# Patient Record
Sex: FEMALE | Race: WHITE | NOT HISPANIC OR LATINO | Employment: FULL TIME | URBAN - METROPOLITAN AREA
[De-identification: names, ages, dates, MRNs, and addresses within clinical notes are randomized per-mention and may not be internally consistent; named-entity substitution may affect disease eponyms.]

---

## 2017-01-09 ENCOUNTER — ALLSCRIPTS OFFICE VISIT (OUTPATIENT)
Dept: OTHER | Facility: OTHER | Age: 57
End: 2017-01-09

## 2017-01-09 DIAGNOSIS — E78.5 HYPERLIPIDEMIA: ICD-10-CM

## 2017-01-09 DIAGNOSIS — M25.50 PAIN IN JOINT: ICD-10-CM

## 2017-03-24 ENCOUNTER — ALLSCRIPTS OFFICE VISIT (OUTPATIENT)
Dept: OTHER | Facility: OTHER | Age: 57
End: 2017-03-24

## 2017-09-08 ENCOUNTER — GENERIC CONVERSION - ENCOUNTER (OUTPATIENT)
Dept: OTHER | Facility: OTHER | Age: 57
End: 2017-09-08

## 2017-09-08 DIAGNOSIS — R30.0 DYSURIA: ICD-10-CM

## 2017-10-11 ENCOUNTER — GENERIC CONVERSION - ENCOUNTER (OUTPATIENT)
Dept: OTHER | Facility: OTHER | Age: 57
End: 2017-10-11

## 2017-10-11 DIAGNOSIS — Z12.31 ENCOUNTER FOR SCREENING MAMMOGRAM FOR MALIGNANT NEOPLASM OF BREAST: ICD-10-CM

## 2017-10-19 ENCOUNTER — GENERIC CONVERSION - ENCOUNTER (OUTPATIENT)
Dept: OTHER | Facility: OTHER | Age: 57
End: 2017-10-19

## 2017-10-22 ENCOUNTER — GENERIC CONVERSION - ENCOUNTER (OUTPATIENT)
Dept: OTHER | Facility: OTHER | Age: 57
End: 2017-10-22

## 2017-10-22 LAB
BACTERIA UR QL AUTO: NORMAL /HPF
BILIRUB UR QL STRIP: NEGATIVE
COLOR UR: YELLOW
COMMENT (HISTORICAL): CLEAR
CULTURE RESULT (HISTORICAL): NORMAL
FECAL OCCULT BLOOD DIAGNOSTIC (HISTORICAL): NEGATIVE
GLUCOSE (HISTORICAL): NEGATIVE
HYALINE CASTS #/AREA URNS LPF: NORMAL /LPF
KETONES UR STRIP-MCNC: NEGATIVE MG/DL
LEUKOCYTE ESTERASE UR QL STRIP: NEGATIVE
NITRITE UR QL STRIP: NEGATIVE
PH UR STRIP.AUTO: 6 [PH] (ref 5–8)
PROT UR STRIP-MCNC: NEGATIVE MG/DL
RBC (HISTORICAL): NORMAL /HPF
SP GR UR STRIP.AUTO: 1.02 (ref 1–1.03)
SQUAMOUS EPITHELIAL CELLS (HISTORICAL): NORMAL /HPF
WBC # BLD AUTO: NORMAL /HPF

## 2017-11-13 ENCOUNTER — ALLSCRIPTS OFFICE VISIT (OUTPATIENT)
Dept: OTHER | Facility: OTHER | Age: 57
End: 2017-11-13

## 2017-11-13 PROCEDURE — G0145 SCR C/V CYTO,THINLAYER,RESCR: HCPCS | Performed by: FAMILY MEDICINE

## 2017-11-13 PROCEDURE — 87624 HPV HI-RISK TYP POOLED RSLT: CPT | Performed by: FAMILY MEDICINE

## 2017-11-15 ENCOUNTER — LAB REQUISITION (OUTPATIENT)
Dept: LAB | Facility: HOSPITAL | Age: 57
End: 2017-11-15
Payer: COMMERCIAL

## 2017-11-15 DIAGNOSIS — Z01.419 ENCOUNTER FOR GYNECOLOGICAL EXAMINATION WITHOUT ABNORMAL FINDING: ICD-10-CM

## 2017-11-15 NOTE — PROGRESS NOTES
Assessment  1  Encounter for routine gynecological examination (V72 31) (Z01 419)    Plan     · Follow-up visit in 4 Months Evaluation and Treatment  Follow-up  Status: Hold For -Scheduling  Requested for: [id number]  Ordered; For: ADD (attention deficit disorder); Ordered By: Marixa Arriola  Performed:   Due: 35GRG3992     · ([address] [facility]) Pap IG, HPV-hr; Status:Active; Requested for:13Nov2017;   Perform:[facility]; Order Comments:Sample taken from cervix and endocervix  FDLMP over 15 months ago  Last pap about 3 years ago and reported as wnl ; Due:54Dfe1425; Ordered; For:Encounter for routine gynecological examination; Ordered By:Yadira Valentine;    Discussion/Summary  health maintenance visit Currently, she eats a healthy diet  the risks and benefits of cervical cancer screening were discussed HPV and Pap Co-testing Done Today Breast cancer screening: the risks and benefits of breast cancer screening were discussed and mammogram is current  Colorectal cancer screening: the risks and benefits of colorectal cancer screening were discussed and colorectal cancer screening is current  Osteoporosis screening: the risks and benefits of osteoporosis screening were discussed and bone mineral density testing is not indicated  Will call with Pap results  Try to get at least 1200 mg of calcium in your diet daily and take at least 2000 units of vitamin D3 daily  Continue weight-bearing exercise, and flu shot today  Patient is able to Self-Care  The treatment plan was reviewed with the patient/guardian  The patient/guardian understands and agrees with the treatment plan      Chief Complaint  Patient is in the office today for a GYN examination      History of Present Illness  HPI: Pt is here for her routine gyn f/u  Has a lot of problems at home  Very stressful, but now doing better  Last pap 2014 and reported as wnl  FDLMP over 1 year ago  Always has normal paps, but told she has dense breasts   pt does not examine her breast  Great aunt with breast cancer  GYN , Adult [facility]: The patient is being seen for a gynecology evaluation  The last health maintenance visit was 3 year(s) ago  Social History: Household members include spouse,-- daughter(s)-- and-- and mother in law  She is   Work status: working full time-- and-- occupation:   The patient is a former cigarette smoker  She reports occasional alcohol use  General Health: The patient's health since the last visit is described as good  She has regular dental visits  -- She denies vision problems  -- She denies hearing loss  Immunizations status: up to date  Lifestyle:  She does not have a healthy diet  -- She has weight concerns  -- She does not exercise regularly  -- She does not use tobacco -- She consumes alcohol -- She denies drug use  Reproductive health: the patient is postmenopausal    Screening:      Review of Systems  no pelvic pain,-- no pelvic pressure,-- no vaginal pain,-- no vaginal discharge,-- no vaginal itching,-- no vaginal lump or mass,-- no vaginal odor,-- no dysuria,-- no bladder pain,-- no hematuria,-- no change in urinary frequency,-- no abnormal urethral discharge-- and-- urine is not foul-smelling  Constitutional: no fever-- and-- no chills  ENT: no ear ache, no loss of hearing, no nosebleeds or nasal discharge, no sore throat or hoarseness  Cardiovascular: no chest pain-- and-- no palpitations  Respiratory: no cough-- and-- no wheezing  Breasts: no breast pain-- and-- no breast lump  Gastrointestinal: + reflux, but-- no abdominal pain-- and-- no constipation  Genitourinary: no dysuria-- and-- no incontinence  Musculoskeletal: no arthralgias-- and-- no myalgias  Integumentary: no rashes-- and-- no skin lesions  Neurological: no headache-- and-- no dizziness  ROS reviewed  OB History  Pregnancy History (Brief):  Prior pregnancies: : 6   Para: 3 (full-term),-- 1 (abortions)-- and-- 3 (living)  Delivery type: vaginal  Additional pregnancy history details: 2 miscarriage(s)       Active Problems    1  ADD (attention deficit disorder) (314 00) (F98 8)  2  Anxiety state (300 00) (F41 1)  3  Arthralgia of multiple joints (719 49) (M25 50)  4  Bleeding (459 0) (R58)  5  Dysuria (788 1) (R30 0)  6  Encounter for routine gynecological examination (V72 31) (Z01 419)  7  Encounter for screening mammogram for malignant neoplasm of breast (V76 12) (Z12 31)  8  Flu vaccine need (V04 81) (Z23)  9  History of varicose vein ligation and stripping (V15 29) (Z98 890)  10  Hyperlipidemia (272 4) (E78 5)  11  Irregular heart beat (427 9) (I49 9)  12  Sinusitis (473 9) (J32 9)  13  Subacute pansinusitis (461 8) (J01 40)  14  Symptomatic menopausal or female climacteric states (627 2) (N95 1)  15  Thyroid nodule (241 0) (E04 1)  16  Varicose veins of left lower extremity with other complications (788 0) (M97 718)  17  Yeast vaginitis (112 1) (B37 3)    Past Medical History    Medical history: No diabetes  No hypertension  No heart disease  The active problems and past medical history were reviewed and updated today  Surgical History   · History of Dilation And Curettage   · History of Leg Repair   · History of Tubal Ligation   · History of Venous Lig  & Stripping + Skin Graft Or Venous Interruption    The surgical history was reviewed and updated today         Family History   · Family history of Diverticulitis   · Family history of diabetes mellitus (V18 0) (Z83 3)   · Family history of hypertension (V17 49) (Z82 49)   · Family history of osteoporosis (V17 81) (Z82 62)   · Family history of varicose veins (V17 49) (Z82 49)   · Family history of diabetes mellitus (V18 0) (Z83 3)   · Family history of hypertension (V17 49) (Z82 49)   · Family history of mental disorder (V17 0) (Z81 8)   · Family history of mental disorder (V17 0) (Z81 8)   · Family history of breast disorder (V19 8) (Z84 2)   · Family history of Diverticulitis   · Family history of osteoporosis (V17 81) (Z82 62)   · Family history of diabetes mellitus (V18 0) (Z83 3)   · Family history of hyperlipidemia (V18 19) (Z83 49)   · Family history of hypertension (V17 49) (Z82 49)    The family history was reviewed and updated today  Social History     · Always uses seat belt   · Consumes alcohol at social events (V49 89) (Z78 9)   · Daily caffeine consumption   · Denied: History of Drug use   · Former smoker (V15 82) (U63 164)   · Full-time employment   · Social alcohol use (Z78 9)   · Three children  The social history was reviewed and updated today  Current Meds  1  BusPIRone HCl - 15 MG Oral Tablet; 1/2 po bid prn for anxiety  May increase to 1 pill bid prn; Therapy: 23Kpg1542 to (Last OB:29SNO2112)  Requested for: 52AZA8621 Ordered  2  Cefuroxime Axetil 500 MG Oral Tablet; 1 bid with food; Therapy: 08DZG2037 to (Last Rx:24Mar2017)  Requested for: 24Mar2017 Ordered  3  Fluconazole 150 MG Oral Tablet; TAKE 1 TABLET 1 TIME ONLY; Therapy: 28DBF7061 to (Evaluate:89Woe3553)  Requested for: 16Gwc6652; Last Rx:77Mvl4210 Ordered  4  Multiple Vitamin TABS Recorded  5  Strattera 40 MG Oral Capsule; TAKE 1 CAPSULE BY MOUTH EVERY DAY; Therapy: 09JZY4697 to (Evaluate:93Lsp6073)  Requested for: 40ANX2524; Last Rx:09Jan2017 Ordered    Allergies  1  Penicillins  2  Seasonal    Vitals   Recorded: 52YSU5677 05:42PM   Temperature 98 1 F   Heart Rate 71   Systolic 403   Diastolic 74   Height 5 ft 7 in   Weight 171 lb 2 oz   BMI Calculated 26 8   BSA Calculated 1 89   O2 Saturation 98       Physical Exam   Constitutional  General appearance: No acute distress, well appearing and well nourished  Neck  Neck: Normal, supple, trachea midline, no masses  Thyroid: Normal, no thyromegaly  Pulmonary  Respiratory effort: No increased work of breathing or signs of respiratory distress  Auscultation of lungs: Clear to auscultation     Cardiovascular  Peripheral vascular exam: Normal pulses Throughout  Genitourinary  External genitalia: Normal and no lesions appreciated  Vagina: Normal, no lesions or dryness appreciated  Urethra: Normal    Urethral meatus: Normal    Bladder: Normal, soft, non-tender and no prolapse or masses appreciated  Cervix: Normal, no palpable masses  Uterus: Normal, non-tender, not enlarged, and no palpable masses  -- Not palpable r/t weight  Chest  Breasts: Normal and no dimpling or skin changes noted  Abdomen  Abdomen: Normal, non-tender, and no organomegaly noted  Lymphatic  Palpation of lymph nodes in neck, axillae, groin and/or other locations: No lymphadenopathy or masses noted  Psychiatric  Orientation to person, place, and time: Normal    Mood and affect: Normal        Attending Note  Collaborating Physician Note: Collaborating Note: I supervised the Advanced Practitioner-- and-- I agree with the Advanced Practitioner note  Signatures   Electronically signed by :  SUSHILA Brink; Nov 13 2017  6:08PM EST                       (Author)    Electronically signed by : Mynor Lara DO; Nov 14 2017  8:28AM EST                       (Co-author)

## 2017-11-16 LAB — HPV RRNA GENITAL QL NAA+PROBE: NORMAL

## 2017-11-20 LAB
LAB AP GYN PRIMARY INTERPRETATION: NORMAL
Lab: NORMAL

## 2018-01-13 VITALS
OXYGEN SATURATION: 98 % | TEMPERATURE: 98.1 F | DIASTOLIC BLOOD PRESSURE: 74 MMHG | WEIGHT: 171.13 LBS | SYSTOLIC BLOOD PRESSURE: 126 MMHG | HEART RATE: 71 BPM | HEIGHT: 67 IN | BODY MASS INDEX: 26.86 KG/M2

## 2018-01-14 VITALS
HEIGHT: 67 IN | TEMPERATURE: 99.1 F | DIASTOLIC BLOOD PRESSURE: 80 MMHG | OXYGEN SATURATION: 100 % | WEIGHT: 175.13 LBS | RESPIRATION RATE: 12 BRPM | BODY MASS INDEX: 27.49 KG/M2 | HEART RATE: 65 BPM | SYSTOLIC BLOOD PRESSURE: 118 MMHG

## 2018-01-14 VITALS
DIASTOLIC BLOOD PRESSURE: 60 MMHG | WEIGHT: 175 LBS | SYSTOLIC BLOOD PRESSURE: 106 MMHG | HEIGHT: 67 IN | BODY MASS INDEX: 27.47 KG/M2 | OXYGEN SATURATION: 98 %

## 2018-01-15 NOTE — RESULT NOTES
Verified Results  (1) URINALYSIS w URINE C/S REFLEX (will reflex a microscopy if leukocytes, occult blood, or nitrites are not within normal limits) 21Oct2017 08:43AM Yadria Valentine     Test Name Result Flag Reference   SPECIFIC GRAVITY 1 020  1 001-1 035   BILIRUBIN NEGATIVE  NEGATIVE   GLUCOSE NEGATIVE  NEGATIVE   COLOR YELLOW  YELLOW   APPEARANCE CLEAR  CLEAR   PH 6 0  5 0-8 0   KETONES NEGATIVE  NEGATIVE   OCCULT BLOOD NEGATIVE  NEGATIVE   PROTEIN NEGATIVE  NEGATIVE   SQUAMOUS EPITHELIAL CELLS NONE SEEN /HPF  < OR = 5   HYALINE CAST NONE SEEN /LPF  NONE SEEN   REFLEXIVE URINE CULTURE NO CULTURE INDICATED     RBC 0-2 /HPF  < OR = 2   NITRITE NEGATIVE  NEGATIVE   LEUKOCYTE ESTERASE NEGATIVE  NEGATIVE   WBC 0-5 /HPF  < OR = 5   BACTERIA NONE SEEN /HPF  NONE SEEN

## 2018-01-16 NOTE — MISCELLANEOUS
To whom this may concern,     Lord Caraballo is under my professional care  She was prescribed Strattera 40mg oral capsules to be taken once a day to help treat her ADD (attention deficit disorder)  If there are any question or concerns please feel to contact our office at [phone], option 2      Sincerely,          SUSHILA Mckinney

## 2018-02-06 DIAGNOSIS — F90.9 ATTENTION DEFICIT HYPERACTIVITY DISORDER (ADHD), UNSPECIFIED ADHD TYPE: Primary | ICD-10-CM

## 2018-02-06 DIAGNOSIS — Z79.899 HIGH RISK MEDICATIONS (NOT ANTICOAGULANTS) LONG-TERM USE: Primary | ICD-10-CM

## 2018-02-06 RX ORDER — ATOMOXETINE 40 MG/1
1 CAPSULE ORAL DAILY
COMMUNITY
Start: 2014-03-15 | End: 2018-02-06 | Stop reason: SDUPTHER

## 2018-02-06 NOTE — TELEPHONE ENCOUNTER
Pt came into office today thinking she had a appt scheduled this morning with you  Informed her she was not on the schedule , she needs her meds refilled, says she is out I did get her scheduled for 02/20/2018 with you but wanted to know if her meds can be refilled before hand  Also she would like order to be put in for lab work before her Next appt  Boaz Side

## 2018-02-07 RX ORDER — ATOMOXETINE 40 MG/1
40 CAPSULE ORAL DAILY
Qty: 30 CAPSULE | Refills: 3 | Status: SHIPPED | OUTPATIENT
Start: 2018-02-07 | End: 2018-11-18 | Stop reason: SDUPTHER
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
## 2018-02-12 LAB
ALBUMIN SERPL-MCNC: 4.6 G/DL (ref 3.6–5.1)
ALBUMIN/GLOB SERPL: 1.6 (CALC) (ref 1–2.5)
ALP SERPL-CCNC: 78 U/L (ref 33–130)
ALT SERPL-CCNC: 20 U/L (ref 6–29)
APPEARANCE UR: CLEAR
AST SERPL-CCNC: 16 U/L (ref 10–35)
B BURGDOR AB SER IA-ACNC: <0.9 INDEX
BILIRUB SERPL-MCNC: 0.5 MG/DL (ref 0.2–1.2)
BILIRUB UR QL STRIP: NEGATIVE
BUN SERPL-MCNC: 18 MG/DL (ref 7–25)
BUN/CREAT SERPL: ABNORMAL (CALC) (ref 6–22)
CALCIUM SERPL-MCNC: 10.1 MG/DL (ref 8.6–10.4)
CHLORIDE SERPL-SCNC: 104 MMOL/L (ref 98–110)
CHOLEST SERPL-MCNC: 253 MG/DL
CHOLEST/HDLC SERPL: 4.4 (CALC)
CO2 SERPL-SCNC: 27 MMOL/L (ref 20–31)
COLOR UR: YELLOW
CREAT SERPL-MCNC: 0.93 MG/DL (ref 0.5–1.05)
GLOBULIN SER CALC-MCNC: 2.8 G/DL (CALC) (ref 1.9–3.7)
GLUCOSE SERPL-MCNC: 100 MG/DL (ref 65–99)
GLUCOSE UR QL STRIP: NEGATIVE
HDLC SERPL-MCNC: 58 MG/DL
HGB UR QL STRIP: NEGATIVE
KETONES UR QL STRIP: NEGATIVE
LDLC SERPL CALC-MCNC: 165 MG/DL (CALC)
LEUKOCYTE ESTERASE UR QL STRIP: NEGATIVE
NITRITE UR QL STRIP: NEGATIVE
NONHDLC SERPL-MCNC: 195 MG/DL (CALC)
PH UR STRIP: 7 [PH] (ref 5–8)
POTASSIUM SERPL-SCNC: 4 MMOL/L (ref 3.5–5.3)
PROT SERPL-MCNC: 7.4 G/DL (ref 6.1–8.1)
PROT UR QL STRIP: NEGATIVE
SL AMB EGFR AFRICAN AMERICAN: 79 ML/MIN/1.73M2
SL AMB EGFR NON AFRICAN AMERICAN: 68 ML/MIN/1.73M2
SODIUM SERPL-SCNC: 142 MMOL/L (ref 135–146)
SP GR UR STRIP: 1.02 (ref 1–1.03)
TRIGL SERPL-MCNC: 158 MG/DL

## 2018-02-20 ENCOUNTER — OFFICE VISIT (OUTPATIENT)
Dept: FAMILY MEDICINE CLINIC | Facility: CLINIC | Age: 58
End: 2018-02-20
Payer: COMMERCIAL

## 2018-02-20 VITALS
OXYGEN SATURATION: 98 % | TEMPERATURE: 97.4 F | HEART RATE: 64 BPM | HEIGHT: 67 IN | DIASTOLIC BLOOD PRESSURE: 80 MMHG | SYSTOLIC BLOOD PRESSURE: 128 MMHG | RESPIRATION RATE: 14 BRPM | BODY MASS INDEX: 27.4 KG/M2 | WEIGHT: 174.6 LBS

## 2018-02-20 DIAGNOSIS — M25.532 WRIST PAIN, ACUTE, LEFT: ICD-10-CM

## 2018-02-20 DIAGNOSIS — L98.9 SKIN LESIONS, GENERALIZED: ICD-10-CM

## 2018-02-20 DIAGNOSIS — J34.2 DEVIATED SEPTUM: ICD-10-CM

## 2018-02-20 DIAGNOSIS — E78.2 MIXED HYPERLIPIDEMIA: ICD-10-CM

## 2018-02-20 DIAGNOSIS — F90.2 ATTENTION DEFICIT HYPERACTIVITY DISORDER (ADHD), COMBINED TYPE: Primary | ICD-10-CM

## 2018-02-20 PROCEDURE — 99214 OFFICE O/P EST MOD 30 MIN: CPT | Performed by: FAMILY MEDICINE

## 2018-02-20 PROCEDURE — 3008F BODY MASS INDEX DOCD: CPT | Performed by: FAMILY MEDICINE

## 2018-02-20 PROCEDURE — 1036F TOBACCO NON-USER: CPT | Performed by: FAMILY MEDICINE

## 2018-02-20 RX ORDER — BUSPIRONE HYDROCHLORIDE 15 MG/1
TABLET ORAL
Refills: 5 | COMMUNITY
Start: 2017-12-05 | End: 2018-07-11 | Stop reason: SDUPTHER

## 2018-02-20 RX ORDER — ROSUVASTATIN CALCIUM 5 MG/1
5 TABLET, COATED ORAL DAILY
Qty: 30 TABLET | Refills: 1 | Status: SHIPPED | OUTPATIENT
Start: 2018-02-20 | End: 2019-08-28 | Stop reason: ALTCHOICE

## 2018-02-20 RX ORDER — MULTIVITAMIN
TABLET ORAL
COMMUNITY

## 2018-02-20 NOTE — PROGRESS NOTES
Assessment/Plan:     Chronic Problems:  Attention deficit hyperactivity disorder (ADHD), combined type   Stay on the current meds you seem to be doing well on this medication  Mixed hyperlipidemia    Will start low-dose rosuvastatin and check in 6 weeks  May need to increase the dose  Visit Diagnosis:  Diagnoses and all orders for this visit:    Attention deficit hyperactivity disorder (ADHD), combined type    Mixed hyperlipidemia  -     rosuvastatin (CRESTOR) 5 mg tablet; Take 1 tablet (5 mg total) by mouth daily  -     Lipid panel; Future  -     AST; Future  -     ALT; Future    Wrist pain, acute, left  Comments:    Suggest you change your watch to the right wrist   May need x-rays  Advil or Aleve for pain  Skin lesions, generalized  Comments: Will refer to Dr Helena Melissa time  Orders:  -     Ambulatory referral to Dermatology; Future    Deviated septum  Comments:   will refer to Dr Sera Collins  May need sleep studies  Orders:  -     Ambulatory Referral to Otolaryngology; Future    Other orders  -     Multiple Vitamin tablet; Take by mouth  -     busPIRone (BUSPAR) 15 mg tablet; TAKE 1/2 TABLET BY MOUTH AS NEEDED FOR ANXIETY  MAY INSCREASE TO 1 TABLET TWICE DAILY AS NEEDED          Subjective:    Patient ID: Isaías Brizuela is a 62 y o  female  Pt is here for routine f/u  Seen by ent in [place] and told she has a deviated septum  Wants referral to ent for evaluation  Pt states she snores terribly but does not stop breathing  Also has pain in the left wrist  Having problems opening jars  Gets sharp pains  Also has some moles she wants looked at  Would like to see Dr Vignesh Sanders  Feels she is doing well on strattera and buspar  Feels bloated at times  Takes all other meds as directed  No side effects noted           The following portions of the patient's history were reviewed and updated as appropriate: allergies, current medications, past family history, past medical history, past social history, past surgical history and problem list     Review of Systems   Constitutional: Negative for chills and fever  HENT: Negative for ear pain, postnasal drip and sore throat  Eyes: Negative for pain and visual disturbance  Respiratory: Negative for cough, chest tightness, shortness of breath and wheezing  Cardiovascular: Negative for chest pain, palpitations and leg swelling  Gastrointestinal: Negative for abdominal pain, constipation, diarrhea, nausea and vomiting         C/o abdominal bloating  Endocrine: Negative for cold intolerance, heat intolerance and polyuria  Genitourinary: Negative for dysuria, frequency and urgency  Musculoskeletal: Positive for arthralgias (Wrist pain on the left  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   )  Negative for gait problem and myalgias  Skin: Negative for pallor and rash  Neurological: Negative for dizziness, tremors, light-headedness and numbness  Psychiatric/Behavioral: Negative for dysphoric mood and suicidal ideas  The patient is nervous/anxious (but the buspar helps  )  Thinks the strattera is perfect for her adhd  /80   Pulse 64   Temp (!) 97 4 °F (36 3 °C)   Resp 14   Ht 5' 7" (1 702 m)   Wt 79 2 kg (174 lb 9 6 oz)   SpO2 98%   BMI 27 35 kg/m²   Social History     Social History    Marital status: /Civil Union     Spouse name: N/A    Number of children: 3    Years of education: N/A     Occupational History    Not on file  Social History Main Topics    Smoking status: Former Smoker    Smokeless tobacco: Never Used    Alcohol use Yes      Comment: CONSUMES ALCOHOL AT SOCIAL EVENTS; SOCIAL USE     Drug use: No    Sexual activity: Not on file     Other Topics Concern    Not on file     Social History Narrative    Always uses seat belt    Daily caffeine consumption    Full-time employment         No past medical history on file    Family History   Problem Relation Age of Onset    Diverticulitis Mother  Diabetes Mother     Hypertension Mother     Osteoporosis Mother     Varicose Veins Mother     Diabetes Father     Hypertension Father     Mental illness Brother     Other Paternal Grandmother      BREAST DISORDER    Diverticulitis Maternal Aunt     Osteoporosis Maternal Aunt     Diabetes Family     Hyperlipidemia Family     Hypertension Family     Mental illness Daughter      Past Surgical History:   Procedure Laterality Date    DILATION AND CURETTAGE OF UTERUS      LEG SURGERY Left     1998 BY DR Susie Sherman; 2005 (AROUND) BY A DR AT [address] [place] [address]    VEIN LIGATION AND STRIPPING      + SKIN GRAFT OR VENOUS INTERRUPTION; BY DR Donnie SU        Current Outpatient Prescriptions:     atoMOXetine (STRATTERA) 40 mg capsule, Take 1 capsule (40 mg total) by mouth daily, Disp: 30 capsule, Rfl: 3    busPIRone (BUSPAR) 15 mg tablet, TAKE 1/2 TABLET BY MOUTH AS NEEDED FOR ANXIETY   MAY INSCREASE TO 1 TABLET TWICE DAILY AS NEEDED, Disp: , Rfl: 5    Multiple Vitamin tablet, Take by mouth, Disp: , Rfl:     rosuvastatin (CRESTOR) 5 mg tablet, Take 1 tablet (5 mg total) by mouth daily, Disp: 30 tablet, Rfl: 1    CBC:     Chemistry Profile:     Results from last 6 Months  Lab Units 02/10/18  0808   SL AMB UREA NITOGEN mg/dL 18   CREATININE mg/dL 0 93   SL AMB CALCIUM mg/dL 10 1     Coagulation Studies:     Endocrine Studies: @LABRCNTUrinalysis:   Lab Results   Component Value Date    SPECGRAV 1 018 02/10/2018    AMYLASE: No results found for: AMYLASEOP(HGBA1C,DOVUFNALK6D,ZSC5EVRSUYHD,T3FREE,B8LRARY,FREET4)@      Lab Review   Orders Only on 02/10/2018   Component Date Value    Total Cholesterol 02/10/2018 253*    SL AMB HDL CHOLESTEROL 02/10/2018 58     Triglycerides 02/10/2018 158*    SL AMB LDL-CHOLESTEROL 02/10/2018 165*    SL AMB CHOL/HDLC RATIO 02/10/2018 4 4     SL AMB NON HDL CHOLESTER* 02/10/2018 195*    SL AMB GLUCOSE 02/10/2018 100*    BUN 02/10/2018 18     Creatinine, Serum 02/10/2018 0 93     eGFR Non  02/10/2018 68     SL AMB EGFR  AMER* 02/10/2018 79     SL AMB BUN/CREATININE RA* 83/84/7708 NOT APPLICABLE     SL AMB SODIUM 02/10/2018 142     SL AMB POTASSIUM 02/10/2018 4 0     SL AMB CHLORIDE 02/10/2018 104     SL AMB CARBON DIOXIDE 02/10/2018 27     SL AMB CALCIUM 02/10/2018 10 1     SL AMB PROTEIN, TOTAL 02/10/2018 7 4     Serum Albumin 02/10/2018 4 6     SL AMB GLOBULIN 02/10/2018 2 8     SL AMB ALBUMIN/GLOBULIN * 02/10/2018 1 6     SL AMB BILIRUBIN, TOTAL 02/10/2018 0 5     SL AMB ALKALINE PHOSPHAT* 02/10/2018 78     SL AMB AST 02/10/2018 16     SL AMB ALT 02/10/2018 20     SL AMB LYME AB SCREEN 02/10/2018 <0 90     SL AMB COLOR, URINE 02/10/2018 YELLOW     Urine Appearance 02/10/2018 CLEAR     Specific Gravity 02/10/2018 1 018     Ph 02/10/2018 7 0     SL AMB GLUCOSE, URINE, Q* 02/10/2018 NEGATIVE     SL AMB BILIRUBIN, URINE 02/10/2018 NEGATIVE     SL AMB KETONE, URINE, QU* 02/10/2018 NEGATIVE     SL AMB BLOOD, URINE 02/10/2018 NEGATIVE     SL AMB PROTEIN, URINE, Q* 02/10/2018 NEGATIVE     SL AMB NITRITES URINE, Q* 02/10/2018 NEGATIVE     SL AMB LEUKOCYTE ESTERAS* 02/10/2018 NEGATIVE    Lab Requisition on 11/13/2017   Component Date Value    Case Report 11/13/2017                      Value:Gynecologic Cytology Report                       Case: [id number]                                  Authorizing Provider:  SUSHILA Lau Collected:           11/13/2017                   First Screen:          ELIAZAR Carnes  Received:            11/15/2017 1359              Specimen:    LIQUID-BASED PAP, SCREENING, Cervix, Endocervical                                          Primary Interpretation 11/13/2017 Negative for intraepithelial lesion or malignancy     Specimen Adequacy 11/13/2017 Satisfactory for evaluation  Endocervical/transformation zone component present       High Risk HPV Result 11/13/2017                      Value:HPV, High Risk: HPV NEG, HPV16 NEG, HPV18 NEG      Other High Risk HPV Negative, HPV 16 Negative, HPV 18 Negative  HPV types: 16,18,31,33,35,39,45,51,52,56,58,59,66 and 68 DNA are undetectable or below the pre-set threshold  [employer]s FDA approved Tena 4800 is utilized with strict adherence to the s instruction  manual to test for the presence of High-Risk HPV DNA, as well as HPV 16 and HPV 18  This instrument  has been validated by our laboratory and/or by the   A negative result does not preclude the presence of HPV infection because results depend on adequate  specimen collection, absence of inhibitors and sufficient DNA to be detected  Additionally, HPV negative  results are not intended to prevent women from proceeding to colposcopy if clinically warranted  Positive HPV test results indicate the presence of any one or more of the high risk types, but since patients  are often co-infected with low-risk types it does not rule out the presence of low-risk                           types in patients  with mixed infections   Additional Information 11/13/2017                      Value: This result contains rich text formatting which cannot be displayed here   LMP 11/13/2017      HPV, High Risk 11/13/2017 HPV NEG, HPV16 NEG, HPV18 NEG         Imaging: No results found  Objective:     Physical Exam   Constitutional: She is oriented to person, place, and time  HENT:   Head: Normocephalic  Right Ear: External ear normal    Left Ear: External ear normal    Eyes: EOM are normal  Pupils are equal, round, and reactive to light  Right eye exhibits no discharge  Neck: Neck supple  No tracheal deviation present  No thyromegaly present  Cardiovascular: Normal rate and normal heart sounds  No murmur heard  Pulmonary/Chest: No respiratory distress  She has no wheezes  She has no rales  Abdominal: Soft  There is no tenderness  Musculoskeletal: Normal range of motion  She exhibits tenderness (over the left wrist  Seems to have full rom  Pt wears her thick watch over this area  )  Lymphadenopathy:     She has no cervical adenopathy  Neurological: She is alert and oriented to person, place, and time  Skin: Skin is warm and dry  Multiple skin lesions on body  Follows with derm regularly, wants to switch back to Dr Roshni Peralta  Psychiatric: She has a normal mood and affect  Patient Instructions   All labs reviewed with patient  You need to be on meds for cholesterol  Will start low-dose rosuvastatin and repeat in 6 weeks  Also try to watch the diet  Cut back on fatty foods and work on increasing the exercise  Will refer to ENT to evaluate for deviated septum and your snoring  You may need sleep studies  Use your watch on the other wrist and see if this helps your pains  Can use motrin or aleve for wrist pains  Ok to f/u with Dr Roshni Peralta  Hyperlipidemia   AMBULATORY CARE:   Hyperlipidemia  is a high level of lipids (fats) in your blood  These lipids include cholesterol or triglycerides  Lipids are made by your body  They also come from the foods you eat  Your body needs lipids to work properly, but high levels increase your risk for heart disease, heart attack, and stroke     Call 911 for any of the following:   · You have any of the following signs of a heart attack:      ¨ Squeezing, pressure, or pain in your chest that lasts longer than 5 minutes or returns    ¨ Discomfort or pain in your back, neck, jaw, stomach, or arm     ¨ Trouble breathing    ¨ Nausea or vomiting    ¨ Lightheadedness or a sudden cold sweat, especially with chest pain or trouble breathing    · You have any of the following signs of a stroke:      ¨ Numbness or drooping on one side of your face     ¨ Weakness in an arm or leg    ¨ Confusion or difficulty speaking    ¨ Dizziness, a severe headache, or vision loss  Contact your healthcare provider if:   · You have questions or concerns about your condition or care  Treatment of hyperlipidemia  may first include lifestyle changes to help decrease your lipid levels  You may also need to take medicine to lower your lipid levels  Some of the lifestyle changes you may need to make include the following:  · Maintain a healthy weight  Ask your healthcare provider how much you should weigh  Ask him or her to help you create a weight loss plan if you are overweight  Weight loss can decrease your cholesterol and triglyceride levels  · Exercise as directed  Exercise lowers your cholesterol levels and helps you maintain a healthy weight  Get 40 minutes or more of moderate exercise 3 to 4 days each week  You can split your exercise into four 10-minute workouts instead of 40 minutes at one time  Examples of moderate exercises include walking briskly, swimming, or riding a bike  Work with your healthcare provider to plan the best exercise program for you  · Do not smoke  Nicotine and other chemicals in cigarettes and cigars can increase your risk for a heart attack and stroke  Ask your healthcare provider for information if you currently smoke and need help to quit  E-cigarettes or smokeless tobacco still contain nicotine  Talk to your healthcare provider before you use these products  · Eat heart-healthy foods  Talk to your dietitian about a heart-healthy diet  The following will help you manage hyperlipidemia:     ¨ Decrease the total amount of fat you eat  Choose lean meats, fat-free or 1% fat milk, and low-fat dairy products, such as yogurt and cheese  Limit or do not eat red meat  Red meats are high in fat and cholesterol  ¨ Replace unhealthy fats with healthy fats  Unhealthy fats include saturated fat, trans fat, and cholesterol  Choose soft margarines that are low in saturated fat and have little or no trans fat  Monounsaturated fats are healthy fats   These are found in olive oil, canola oil, avocado, and nuts  Polyunsaturated fats are also healthy  These are found in fish, flaxseed, walnuts, and soybeans  ¨ Eat fruits and vegetables every day  They are low in calories and fat and a good source of essential vitamins  Include dark green, red, and orange vegetables  Examples include spinach, kale, broccoli, and carrots  ¨ Eat foods high in fiber  Choose whole grain, high-fiber foods  Good choices include whole-wheat breads or cereals, beans, peas, fruits, and vegetables  · Ask your healthcare provider if it is safe for you to drink alcohol  Alcohol can increase your cholesterol and triglyceride levels  A drink of alcohol is 12 ounces of beer, 5 ounces of wine, or 1½ ounces of liquor  Follow up with your healthcare provider as directed: You may need to return for more tests  Your healthcare provider may refer you to a dietitian  Write down your questions so you remember to ask them during your visits  © 2017 [facility] Information is for End User's use only and may not be sold, redistributed or otherwise used for commercial purposes  All illustrations and images included in CareNotes® are the copyrighted property of A D [employer] M [employer]  or [employer]  The above information is an  only  It is not intended as medical advice for individual conditions or treatments  Talk to your doctor, nurse or pharmacist before following any medical regimen to see if it is safe and effective for you          Follow up here in 6 weeks    [address] [place], [place] [address]SUSHILA

## 2018-02-20 NOTE — PATIENT INSTRUCTIONS
All labs reviewed with patient  You need to be on meds for cholesterol  Will start low-dose rosuvastatin and repeat in 6 weeks  Also try to watch the diet  Cut back on fatty foods and work on increasing the exercise  Will refer to ENT to evaluate for deviated septum and your snoring  You may need sleep studies  Use your watch on the other wrist and see if this helps your pains  Can use motrin or aleve for wrist pains  Ok to f/u with Dr Roshni Peralta  Hyperlipidemia   AMBULATORY CARE:   Hyperlipidemia  is a high level of lipids (fats) in your blood  These lipids include cholesterol or triglycerides  Lipids are made by your body  They also come from the foods you eat  Your body needs lipids to work properly, but high levels increase your risk for heart disease, heart attack, and stroke  Call 911 for any of the following:   · You have any of the following signs of a heart attack:      ¨ Squeezing, pressure, or pain in your chest that lasts longer than 5 minutes or returns    ¨ Discomfort or pain in your back, neck, jaw, stomach, or arm     ¨ Trouble breathing    ¨ Nausea or vomiting    ¨ Lightheadedness or a sudden cold sweat, especially with chest pain or trouble breathing    · You have any of the following signs of a stroke:      ¨ Numbness or drooping on one side of your face     ¨ Weakness in an arm or leg    ¨ Confusion or difficulty speaking    ¨ Dizziness, a severe headache, or vision loss  Contact your healthcare provider if:   · You have questions or concerns about your condition or care  Treatment of hyperlipidemia  may first include lifestyle changes to help decrease your lipid levels  You may also need to take medicine to lower your lipid levels  Some of the lifestyle changes you may need to make include the following:  · Maintain a healthy weight  Ask your healthcare provider how much you should weigh  Ask him or her to help you create a weight loss plan if you are overweight   Weight loss can decrease your cholesterol and triglyceride levels  · Exercise as directed  Exercise lowers your cholesterol levels and helps you maintain a healthy weight  Get 40 minutes or more of moderate exercise 3 to 4 days each week  You can split your exercise into four 10-minute workouts instead of 40 minutes at one time  Examples of moderate exercises include walking briskly, swimming, or riding a bike  Work with your healthcare provider to plan the best exercise program for you  · Do not smoke  Nicotine and other chemicals in cigarettes and cigars can increase your risk for a heart attack and stroke  Ask your healthcare provider for information if you currently smoke and need help to quit  E-cigarettes or smokeless tobacco still contain nicotine  Talk to your healthcare provider before you use these products  · Eat heart-healthy foods  Talk to your dietitian about a heart-healthy diet  The following will help you manage hyperlipidemia:     ¨ Decrease the total amount of fat you eat  Choose lean meats, fat-free or 1% fat milk, and low-fat dairy products, such as yogurt and cheese  Limit or do not eat red meat  Red meats are high in fat and cholesterol  ¨ Replace unhealthy fats with healthy fats  Unhealthy fats include saturated fat, trans fat, and cholesterol  Choose soft margarines that are low in saturated fat and have little or no trans fat  Monounsaturated fats are healthy fats  These are found in olive oil, canola oil, avocado, and nuts  Polyunsaturated fats are also healthy  These are found in fish, flaxseed, walnuts, and soybeans  ¨ Eat fruits and vegetables every day  They are low in calories and fat and a good source of essential vitamins  Include dark green, red, and orange vegetables  Examples include spinach, kale, broccoli, and carrots  ¨ Eat foods high in fiber  Choose whole grain, high-fiber foods   Good choices include whole-wheat breads or cereals, beans, peas, fruits, and vegetables  · Ask your healthcare provider if it is safe for you to drink alcohol  Alcohol can increase your cholesterol and triglyceride levels  A drink of alcohol is 12 ounces of beer, 5 ounces of wine, or 1½ ounces of liquor  Follow up with your healthcare provider as directed: You may need to return for more tests  Your healthcare provider may refer you to a dietitian  Write down your questions so you remember to ask them during your visits  © 2017 [employer] Information is for End User's use only and may not be sold, redistributed or otherwise used for commercial purposes  All illustrations and images included in CareNotes® are the copyrighted property of A D [employer] M [employer] Inc  or [employer]  The above information is an  only  It is not intended as medical advice for individual conditions or treatments  Talk to your doctor, nurse or pharmacist before following any medical regimen to see if it is safe and effective for you

## 2018-03-30 ENCOUNTER — TELEPHONE (OUTPATIENT)
Dept: FAMILY MEDICINE CLINIC | Facility: CLINIC | Age: 58
End: 2018-03-30

## 2018-03-30 NOTE — TELEPHONE ENCOUNTER
Pt called , She is not going to take any statin's  She never picked it up   It will cause more damage than good

## 2018-07-11 DIAGNOSIS — F41.9 ANXIETY: Primary | ICD-10-CM

## 2018-07-11 RX ORDER — BUSPIRONE HYDROCHLORIDE 15 MG/1
TABLET ORAL
Qty: 60 TABLET | Refills: 3 | Status: SHIPPED | OUTPATIENT
Start: 2018-07-11 | End: 2018-11-18 | Stop reason: SDUPTHER

## 2018-11-07 ENCOUNTER — OFFICE VISIT (OUTPATIENT)
Dept: DERMATOLOGY | Facility: CLINIC | Age: 58
End: 2018-11-07
Payer: COMMERCIAL

## 2018-11-07 DIAGNOSIS — Z13.89 SCREENING FOR SKIN CONDITION: ICD-10-CM

## 2018-11-07 DIAGNOSIS — Z85.828 HISTORY OF SKIN CANCER: ICD-10-CM

## 2018-11-07 DIAGNOSIS — L98.9 UNKNOWN SKIN LESION: Primary | ICD-10-CM

## 2018-11-07 DIAGNOSIS — L82.1 SEBORRHEIC KERATOSIS: ICD-10-CM

## 2018-11-07 PROCEDURE — 88305 TISSUE EXAM BY PATHOLOGIST: CPT | Performed by: PATHOLOGY

## 2018-11-07 PROCEDURE — 99213 OFFICE O/P EST LOW 20 MIN: CPT | Performed by: DERMATOLOGY

## 2018-11-07 PROCEDURE — 11100 PR BIOPSY OF SKIN LESION: CPT | Performed by: DERMATOLOGY

## 2018-11-07 NOTE — PROGRESS NOTES
[facility] DERMATOLOGY  [address]  [facility] [address]  [phone]  [phone]     MRN: [id number] : 1960  Encounter: [id number]  Patient Information: Juliet Tipton  Chief complaint:  Skin cancer checkup nonhealing lesion on chest    History of present illness:  78-year-old female presents for overall skin check previous history of multiple skin cancers treated at [facility] previously  Patient concerned regarding spot that is been present for awhile that is not healing no other concerns at this point  No past medical history on file  Past Surgical History:   Procedure Laterality Date    DILATION AND CURETTAGE OF UTERUS      LEG SURGERY Left      BY DR Betty Agrawal;  (AROUND) BY A DR AT [address] [place] [address]    VEIN LIGATION AND STRIPPING      + SKIN GRAFT OR VENOUS INTERRUPTION; BY DR Jennifer SU      Social History   History   Alcohol Use    Yes     Comment: CONSUMES ALCOHOL AT SOCIAL EVENTS; SOCIAL USE      History   Drug Use No     History   Smoking Status    Former Smoker   Smokeless Tobacco    Never Used     Family History   Problem Relation Age of Onset    Diverticulitis Mother     Diabetes Mother     Hypertension Mother     Osteoporosis Mother     Varicose Veins Mother     Diabetes Father     Hypertension Father     Mental illness Brother     Other Paternal Grandmother         BREAST DISORDER    Diverticulitis Maternal Aunt     Osteoporosis Maternal Aunt     Diabetes Family     Hyperlipidemia Family     Hypertension Family     Mental illness Daughter      Meds/Allergies   Allergies   Allergen Reactions    Penicillins     Pollen Extract        Meds:  Prior to Admission medications    Medication Sig Start Date End Date Taking?  Authorizing Provider   atoMOXetine (STRATTERA) 40 mg capsule Take 1 capsule (40 mg total) by mouth daily 18  Yes SUSHILA Coppola   busPIRone (BUSPAR) 15 mg tablet TAKE 1/2 TABLET BY MOUTH AS NEEDED FOR ANXIETY  MAY INSCREASE TO 1 TABLET TWICE DAILY AS NEEDED 7/11/18  Yes SUSHILA Coppola   Multiple Vitamin tablet Take by mouth   Yes Historical Provider, MD   rosuvastatin (CRESTOR) 5 mg tablet Take 1 tablet (5 mg total) by mouth daily  Patient not taking: Reported on 11/7/2018 2/20/18   SUSHILA Viramontes       Subjective:     Review of Systems:    General: negative for - chills, fatigue, fever,  weight gain or weight loss  Psychological: negative for - anxiety, behavioral disorder, concentration difficulties, decreased libido, depression, irritability, memory difficulties, mood swings, sleep disturbances or suicidal ideation  ENT: negative for - hearing difficulties , nasal congestion, nasal discharge, oral lesions, sinus pain, sneezing, sore throat  Allergy and Immunology: negative for - hives, insect bite sensitivity,  Hematological and Lymphatic: negative for - bleeding problems, blood clots,bruising, swollen lymph nodes  Endocrine: negative for - hair pattern changes, hot flashes, malaise/lethargy, mood swings, palpitations, polydipsia/polyuria, skin changes, temperature intolerance or unexpected weight change  Respiratory: negative for - cough, hemoptysis, orthopnea, shortness of breath, or wheezing  Cardiovascular: negative for - chest pain, dyspnea on exertion, edema,  Gastrointestinal: negative for - abdominal pain, nausea/vomiting  Genito-Urinary: negative for - dysuria, incontinence, irregular/heavy menses or urinary frequency/urgency  Musculoskeletal: negative for - gait disturbance, joint pain, joint stiffness, joint swelling, muscle pain, muscular weakness  Dermatological:  As in HPI  Neurological: negative for confusion, dizziness, headaches, impaired coordination/balance, memory loss, numbness/tingling, seizures, speech problems, tremors or weakness       Objective: There were no vitals taken for this visit      Physical Exam:    General Appearance:    Alert, cooperative, no distress   Head:    Normocephalic, without obvious abnormality, atraumatic           Skin:   A full skin exam was performed including scalp, head scalp, eyes, ears, nose, lips, neck, chest, axilla, abdomen, back, buttocks, bilateral upper extremities, bilateral lower extremities, hands, feet, fingers, toes, fingernails, and toenails 3 mm pearly crusted area noted on right mid chest normal keratotic papules greasy stuck on appearance previous sites of skin cancers noted nothing else atypical no recurrence is noted      Shave Biopsy Procedure Note    Pre-operative Diagnosis:  Basal cell carcinoma    Plan:  1  Instructed to keep the wound dry and covered for 24 and clean thereafter  2  Warning signs of infection were reviewed  3  Recommended that the patient use OTC acetaminophen as needed for pain  4  Return  Pending results of biopsy(ies)    Locations:  Right mid chest    Indications:  Suspicious lesion    Anesthesia: Lidocaine 1% without epinephrine without added sodium bicarbonate    Procedure Details     Patient informed of the risks (including bleeding and infection) and benefits of the   procedure and Verbal informed consent obtained  The lesion and surrounding area were given a sterile prep using alcohol and draped in the usual sterile fashion  A Blue blade razor was used to obtain a specimen  Hemostasis achieved with aluminum chloride  Petrolatum and a sterile dressing applied  The specimen was sent for pathologic examination  The patient tolerated the procedure(s) well  Complications:  none  Assessment:     1  Unknown skin lesion     2  Seborrheic keratosis     3  Screening for skin condition     4   History of skin cancer           Plan:   Skin lesion possible basal cell carcinoma should be amenable to curettage  Seborrheic keratosis patient reassured these are normal growths we acquire with age no treatment needed  History of skin cancer in no recurrence nothing else atypical sunblock recommended follow-up in 6 months  Screening for dermatologic disorders nothing else of concern noted on complete exam follow-up in 6 months    Jose Carlos Goff MD  11/7/2018,4:11 PM    Portions of the record may have been created with voice recognition software   Occasional wrong word or "sound a like" substitutions may have occurred due to the inherent limitations of voice recognition software   Read the chart carefully and recognize, using context, where substitutions have occurred

## 2018-11-07 NOTE — PATIENT INSTRUCTIONS
Skin lesion possible basal cell carcinoma should be amenable to curettage  Seborrheic keratosis patient reassured these are normal growths we acquire with age no treatment needed  History of skin cancer in no recurrence nothing else atypical sunblock recommended follow-up in 6 months  Screening for dermatologic disorders nothing else of concern noted on complete exam follow-up in 6 months  Wound care instructions given to patient

## 2018-11-17 DIAGNOSIS — F90.9 ATTENTION DEFICIT HYPERACTIVITY DISORDER (ADHD), UNSPECIFIED ADHD TYPE: ICD-10-CM

## 2018-11-17 DIAGNOSIS — F41.9 ANXIETY: ICD-10-CM

## 2018-11-18 RX ORDER — BUSPIRONE HYDROCHLORIDE 15 MG/1
TABLET ORAL
Qty: 60 TABLET | Refills: 3 | Status: SHIPPED | OUTPATIENT
Start: 2018-11-18 | End: 2019-08-28 | Stop reason: ALTCHOICE

## 2018-11-18 RX ORDER — ATOMOXETINE 40 MG/1
CAPSULE ORAL
Qty: 30 CAPSULE | Refills: 3 | Status: SHIPPED | OUTPATIENT
Start: 2018-11-18 | End: 2019-07-31 | Stop reason: SDUPTHER

## 2018-11-19 RX ORDER — INFLUENZA VIRUS VACCINE 15; 15; 15; 15 UG/.5ML; UG/.5ML; UG/.5ML; UG/.5ML
SUSPENSION INTRAMUSCULAR
Refills: 0 | COMMUNITY
Start: 2018-10-20 | End: 2018-12-20

## 2018-11-20 ENCOUNTER — OFFICE VISIT (OUTPATIENT)
Dept: DERMATOLOGY | Facility: CLINIC | Age: 58
End: 2018-11-20
Payer: COMMERCIAL

## 2018-11-20 DIAGNOSIS — L98.9 SKIN LESIONS, GENERALIZED: Primary | ICD-10-CM

## 2018-11-20 DIAGNOSIS — C44.519 BASAL CELL CARCINOMA (BCC) OF ANTERIOR CHEST: ICD-10-CM

## 2018-11-20 PROCEDURE — 88305 TISSUE EXAM BY PATHOLOGIST: CPT | Performed by: PATHOLOGY

## 2018-11-20 PROCEDURE — 11100 PR BIOPSY OF SKIN LESION: CPT | Performed by: DERMATOLOGY

## 2018-11-20 PROCEDURE — 17260 DSTRJ MAL LES T/A/L 0.5 CM/<: CPT | Performed by: DERMATOLOGY

## 2018-11-20 NOTE — PATIENT INSTRUCTIONS
Basal cell cancer right chest wall wound care instructions given  Skin lesion lesion on the right medial back appears to be suspicious if this is positive will plan on curette of this and the lesion noted on the lateral right back  Wound care instructions given to patient

## 2018-11-20 NOTE — PROGRESS NOTES
[facility] DERMATOLOGY  [address] [place] [address]  [phone]  [phone]     MRN: [id number] : 1960  Encounter: [id number]  Patient Information: Radha Brown    Subjective:     59-year-old female presents for previously biopsied basal cell carcinoma chest wall also concerned regarding several other spots on her skin     Objective: There were no vitals taken for this visit  Physical Exam:    General Appearance:    Alert, cooperative, no distress   Skin:   Previous site of biopsy on the chest wall noted pearly macules x2 noted on the back went ahead and biopsied a lesion on the the more medial this right side    Procedure: Curettage & Electrodessication of basal cell carcinoma right chest  The reasons for the procedure were explained to the patient  The benefits and risks of the procedure were explained to the patient, including bleeding, infection, incomplete removal, prolonged anesthesia (weeks to months) and rarely nerve damage  The patient is aware that a scar will result from the procedure  The consent for the procedure was obtained verbally and in writing  Lesion Site:  Right chest Curetted Area Size (mm): 3    Using a sterile curette, the appropriate area was curetted  The area was curetted and electrodesiccated x3  Final defect size: 4mm    The wound was left to heal by secondary intention  The wound was cleansed then covered with a dressing  Wound care instructions were verbally given and in writing  I performed the entire procedure  Patient tolerated procedure well  Shave Biopsy Procedure Note    Pre-operative Diagnosis:  Rule out basal cell carcinoma    Plan:  1  Instructed to keep the wound dry and covered for 24 and clean thereafter  2  Warning signs of infection were reviewed  3  Recommended that the patient use OTC acetaminophen as needed for pain     4  Return  Pending results of biopsy(ies)    Locations:  Right upper back medial    Indications:  Suspicious lesion    Anesthesia: Lidocaine 1% without epinephrine without added sodium bicarbonate    Procedure Details     Patient informed of the risks (including bleeding and infection) and benefits of the   procedure and Verbal informed consent obtained  The lesion and surrounding area were given a sterile prep using alcohol and draped in the usual sterile fashion  A Blue blade razor was used to obtain a specimen  Hemostasis achieved with aluminum chloride  Petrolatum and a sterile dressing applied  The specimen was sent for pathologic examination  The patient tolerated the procedure(s) well  Complications:  none  Assessment:     1  Skin lesions, generalized     2  Basal cell carcinoma (BCC) of anterior chest           Plan:   Basal cell cancer right chest wall wound care instructions given  Skin lesion lesion on the right medial back appears to be suspicious if this is positive will plan on curette of this and the lesion noted on the lateral right back    Prior to Admission medications    Medication Sig Start Date End Date Taking? Authorizing Provider   atoMOXetine (STRATTERA) 40 mg capsule TAKE ONE CAPSULE BY MOUTH ONCE DAILY 11/18/18   SUSHILA Coppola   busPIRone (BUSPAR) 15 mg tablet TAKE 1/2 TABLET BY MOUTH AS NEEDED FOR ANXIETY   MAY INSCREASE TO 1 TABLET TWICE DAILY AS NEEDED 11/18/18   SUSHILA Coppola   FLUARIX QUADRIVALENT 0 5 ML ZACH inject 0 5 milliliter intramuscularly 10/20/18   Historical Provider, MD   Multiple Vitamin tablet Take by mouth    Historical Provider, MD   rosuvastatin (CRESTOR) 5 mg tablet Take 1 tablet (5 mg total) by mouth daily  Patient not taking: Reported on 11/7/2018 2/20/18   SUSHILA Viramontes     Allergies   Allergen Reactions    Penicillins     Pollen Extract        Bill Edwards MD  11/20/2018,12:39 PM    Portions of the record may have been created with voice recognition software   Occasional wrong word or "sound a like" substitutions may have occurred due to the inherent limitations of voice recognition software   Read the chart carefully and recognize, using context, where substitutions have occurred

## 2018-12-19 ENCOUNTER — OFFICE VISIT (OUTPATIENT)
Dept: DERMATOLOGY | Facility: CLINIC | Age: 58
End: 2018-12-19
Payer: COMMERCIAL

## 2018-12-19 DIAGNOSIS — C44.519 BASAL CELL CARCINOMA (BCC) OF UPPER BACK: Primary | ICD-10-CM

## 2018-12-19 PROCEDURE — 17260 DSTRJ MAL LES T/A/L 0.5 CM/<: CPT | Performed by: DERMATOLOGY

## 2018-12-19 PROCEDURE — 88305 TISSUE EXAM BY PATHOLOGIST: CPT | Performed by: PATHOLOGY

## 2018-12-19 NOTE — PROGRESS NOTES
[facility] DERMATOLOGY  [address]  [place]  [phone]  [phone]     MRN: [id number] : 1960  Encounter: [id number]  Patient Information: Robby     Subjective:     27-year-old female presents for planned removal of basal cell carcinoma right medial back we had also noted a lesion at the right lateral area at her last visit and plan was to remove this if the other 1 was positive     Objective: There were no vitals taken for this visit  Physical Exam:    General Appearance:    Alert, cooperative, no distress   Skin:   Previous sites of biopsy that is 4 mm size and a or smaller lesion 2 mm size to the right lateral area  Procedure: Curettage & Electrodessication basal cell carcinoma and presumed basal cell carcinoma  The reasons for the procedure were explained to the patient  The benefits and risks of the procedure were explained to the patient, including bleeding, infection, incomplete removal, prolonged anesthesia (weeks to months) and rarely nerve damage  The patient is aware that a scar will result from the procedure  The consent for the procedure was obtained verbally and in writing  Lesion Site:  Right medial back Curetted Area Size (mm):4                        Right lateral back                                           3 since this was presumed the 1st curettings were sent for biopsy   Using a sterile curette, the appropriate area was curetted  The area was curetted and electrodesiccated x3  Final defect size:  5 mm and 4 mm    The wound was left to heal by secondary intention  The wound was cleansed then covered with a dressing  Wound care instructions were verbally given and in writing  I performed the entire procedure  Patient tolerated procedure well  Assessment:     1   Basal cell carcinoma (BCC) of upper back           Plan:   Wound care instructions given to patient        Prior to Admission medications Medication Sig Start Date End Date Taking? Authorizing Provider   atoMOXetine (STRATTERA) 40 mg capsule TAKE ONE CAPSULE BY MOUTH ONCE DAILY 11/18/18   SUSHILA Coppola   busPIRone (BUSPAR) 15 mg tablet TAKE 1/2 TABLET BY MOUTH AS NEEDED FOR ANXIETY  MAY INSCREASE TO 1 TABLET TWICE DAILY AS NEEDED 11/18/18   SUSHILA Coppola   FLUARIX QUADRIVALENT 0 5 ML ZACH inject 0 5 milliliter intramuscularly 10/20/18   Historical Provider, MD   Multiple Vitamin tablet Take by mouth    Historical Provider, MD   rosuvastatin (CRESTOR) 5 mg tablet Take 1 tablet (5 mg total) by mouth daily  Patient not taking: Reported on 11/7/2018 2/20/18   Addy Flores MD  12/19/2018,4:47 PM    Portions of the record may have been created with voice recognition software   Occasional wrong word or "sound a like" substitutions may have occurred due to the inherent limitations of voice recognition software   Read the chart carefully and recognize, using context, where substitutions have occurred

## 2018-12-20 ENCOUNTER — OFFICE VISIT (OUTPATIENT)
Dept: FAMILY MEDICINE CLINIC | Facility: CLINIC | Age: 58
End: 2018-12-20
Payer: COMMERCIAL

## 2018-12-20 VITALS
RESPIRATION RATE: 16 BRPM | TEMPERATURE: 98.2 F | BODY MASS INDEX: 26.84 KG/M2 | HEIGHT: 67 IN | SYSTOLIC BLOOD PRESSURE: 110 MMHG | HEART RATE: 68 BPM | OXYGEN SATURATION: 90 % | WEIGHT: 171 LBS | DIASTOLIC BLOOD PRESSURE: 74 MMHG

## 2018-12-20 DIAGNOSIS — J01.90 ACUTE NON-RECURRENT SINUSITIS, UNSPECIFIED LOCATION: ICD-10-CM

## 2018-12-20 DIAGNOSIS — R05.9 COUGH IN ADULT: Primary | ICD-10-CM

## 2018-12-20 PROCEDURE — 99213 OFFICE O/P EST LOW 20 MIN: CPT | Performed by: NURSE PRACTITIONER

## 2018-12-20 PROCEDURE — 3008F BODY MASS INDEX DOCD: CPT | Performed by: NURSE PRACTITIONER

## 2018-12-20 PROCEDURE — 1036F TOBACCO NON-USER: CPT | Performed by: NURSE PRACTITIONER

## 2018-12-20 RX ORDER — FLUTICASONE PROPIONATE 50 MCG
1 SPRAY, SUSPENSION (ML) NASAL DAILY
Qty: 1 BOTTLE | Refills: 0 | Status: SHIPPED | OUTPATIENT
Start: 2018-12-20 | End: 2019-08-28 | Stop reason: ALTCHOICE

## 2018-12-20 RX ORDER — GUAIFENESIN AND CODEINE PHOSPHATE 100; 10 MG/5ML; MG/5ML
5 SOLUTION ORAL 3 TIMES DAILY PRN
Qty: 120 ML | Refills: 0 | Status: SHIPPED | OUTPATIENT
Start: 2018-12-20 | End: 2019-08-28 | Stop reason: ALTCHOICE

## 2018-12-20 RX ORDER — BRIMONIDINE TARTRATE 0.25 MG/ML
SOLUTION/ DROPS OPHTHALMIC
Refills: 3 | COMMUNITY
Start: 2018-12-07 | End: 2021-04-01 | Stop reason: ALTCHOICE

## 2018-12-20 RX ORDER — AZITHROMYCIN 250 MG/1
TABLET, FILM COATED ORAL
Qty: 6 TABLET | Refills: 0 | Status: SHIPPED | OUTPATIENT
Start: 2018-12-20 | End: 2018-12-24

## 2018-12-20 NOTE — PATIENT INSTRUCTIONS
Avoid irritants(smoke)  Saline irrigation  Manage allergic rhinitis- zyrtec    Humidified air  Increase fluid intake  Sleep with head of bed elevated to promote drainage  Take entire course of antibiotics  Use nasal spray  Rest

## 2018-12-20 NOTE — PROGRESS NOTES
Assessment/Plan:    Acute non-recurrent sinusitis  Sinus symptoms x 8 days  Zpak given  Nasal spray  Hydrate   Rest  Humidify room  Continue mucinex  Problem List Items Addressed This Visit     Cough in adult - Primary    Relevant Medications    guaifenesin-codeine (GUAIFENESIN AC) 100-10 MG/5ML liquid    Acute non-recurrent sinusitis     Sinus symptoms x 8 days  Zpak given  Nasal spray  Hydrate   Rest  Humidify room  Continue mucinex  Relevant Medications    azithromycin (ZITHROMAX) 250 mg tablet    fluticasone (FLONASE) 50 mcg/act nasal spray            Subjective:      Patient ID: Sarah Turner is a 62 y o  female  C/o of congestion  Has red eyes that she is taking meds for glaucoma  Taking Mucinex  The following portions of the patient's history were reviewed and updated as appropriate: allergies, current medications, past family history, past medical history, past social history, past surgical history and problem list     Review of Systems   Constitutional: Negative for fever  HENT: Positive for congestion, postnasal drip, sinus pain and sinus pressure  Respiratory: Positive for cough  Negative for chest tightness, shortness of breath and wheezing  Cardiovascular: Negative  Gastrointestinal: Negative  Endocrine: Negative  Genitourinary: Negative  Musculoskeletal: Positive for myalgias (left arm and back,)  Neurological: Positive for headaches  Negative for dizziness and light-headedness  Hematological: Negative  Psychiatric/Behavioral: The patient is not nervous/anxious  Objective:      /74   Pulse 68   Temp 98 2 °F (36 8 °C)   Resp 16   Ht 5' 7" (1 702 m)   Wt 77 6 kg (171 lb)   SpO2 90%   BMI 26 78 kg/m²          Physical Exam   Constitutional: She is oriented to person, place, and time  She appears well-developed and well-nourished  HENT:   Head: Normocephalic and atraumatic     Right Ear: External ear normal    Left Ear: External ear normal    Eyes: Pupils are equal, round, and reactive to light  Neck: Normal range of motion  Cardiovascular: Normal rate and regular rhythm  Pulmonary/Chest: Effort normal and breath sounds normal    Musculoskeletal: Normal range of motion  Lymphadenopathy:     She has cervical adenopathy  Neurological: She is alert and oriented to person, place, and time  Skin: Skin is warm and dry  Psychiatric: She has a normal mood and affect  Nursing note and vitals reviewed

## 2019-01-21 ENCOUNTER — CLINICAL SUPPORT (OUTPATIENT)
Dept: DERMATOLOGY | Facility: CLINIC | Age: 59
End: 2019-01-21

## 2019-01-21 DIAGNOSIS — C44.519 BASAL CELL CARCINOMA (BCC) OF UPPER BACK: Primary | ICD-10-CM

## 2019-01-21 PROCEDURE — 99024 POSTOP FOLLOW-UP VISIT: CPT | Performed by: DERMATOLOGY

## 2019-01-21 NOTE — PROGRESS NOTES
[facility] DERMATOLOGY  [address] [facility] [place]  [phone]  [phone]     MRN: [id number] : 1960  Encounter: [id number]  Patient Information: Claudetta Dress    Subjective:     59-year-old female presents for follow-up for previously curetted basal cell carcinoma back no problems noted     Objective: There were no vitals taken for this visit  Physical Exam:    General Appearance:    Alert, cooperative, no distress   Skin:   Previous site of basal cell carcinoma healing well     Assessment:     1  Basal cell carcinoma (BCC) of upper back           Plan:   Previous site well healed without evidence of disease follow-up in 6 months      Prior to Admission medications    Medication Sig Start Date End Date Taking? Authorizing Provider   atoMOXetine (STRATTERA) 40 mg capsule TAKE ONE CAPSULE BY MOUTH ONCE DAILY 18   SUSHILA Coppola   busPIRone (BUSPAR) 15 mg tablet TAKE 1/2 TABLET BY MOUTH AS NEEDED FOR ANXIETY   MAY INSCREASE TO 1 TABLET TWICE DAILY AS NEEDED 18   SUSHILA Coppola   fluticasone (FLONASE) 50 mcg/act nasal spray 1 spray into each nostril daily 18   Dannis NailSUSHILA   guaifenesin-codeine (GUAIFENESIN AC) 100-10 MG/5ML liquid Take 5 mL by mouth 3 (three) times a day as needed for cough 18   SUSHILA Weathers   LUMIFY 0 025 % SOLN 1 DROP ON TOP OF EACH EYE 3 TIMES A DAY 18   Historical Provider, MD   Multiple Vitamin tablet Take by mouth    Historical Provider, MD   rosuvastatin (CRESTOR) 5 mg tablet Take 1 tablet (5 mg total) by mouth daily  Patient not taking: Reported on 2018   SUSHILA Sweeney     Allergies   Allergen Reactions    Penicillins     Pollen Extract        Shayy Dean MD  2019,11:18 AM    Portions of the record may have been created with voice recognition software   Occasional wrong word or "sound a like" substitutions may have occurred due to the inherent limitations of voice recognition software   Read the chart carefully and recognize, using context, where substitutions have occurred

## 2019-07-31 DIAGNOSIS — F90.9 ATTENTION DEFICIT HYPERACTIVITY DISORDER (ADHD), UNSPECIFIED ADHD TYPE: ICD-10-CM

## 2019-07-31 RX ORDER — ATOMOXETINE 40 MG/1
CAPSULE ORAL
Qty: 30 CAPSULE | Refills: 0 | Status: SHIPPED | OUTPATIENT
Start: 2019-07-31 | End: 2019-08-16 | Stop reason: SDUPTHER

## 2019-08-21 RX ORDER — ATOMOXETINE 40 MG/1
40 CAPSULE ORAL DAILY
Qty: 90 CAPSULE | Refills: 1 | Status: SHIPPED | OUTPATIENT
Start: 2019-08-21 | End: 2019-08-28 | Stop reason: SDUPTHER

## 2019-08-28 ENCOUNTER — OFFICE VISIT (OUTPATIENT)
Dept: FAMILY MEDICINE CLINIC | Facility: CLINIC | Age: 59
End: 2019-08-28
Payer: COMMERCIAL

## 2019-08-28 VITALS
WEIGHT: 170.6 LBS | BODY MASS INDEX: 26.78 KG/M2 | DIASTOLIC BLOOD PRESSURE: 76 MMHG | HEART RATE: 67 BPM | TEMPERATURE: 98 F | HEIGHT: 67 IN | OXYGEN SATURATION: 96 % | SYSTOLIC BLOOD PRESSURE: 122 MMHG

## 2019-08-28 DIAGNOSIS — F90.2 ATTENTION DEFICIT HYPERACTIVITY DISORDER (ADHD), COMBINED TYPE: Primary | ICD-10-CM

## 2019-08-28 DIAGNOSIS — F90.9 ATTENTION DEFICIT HYPERACTIVITY DISORDER (ADHD), UNSPECIFIED ADHD TYPE: ICD-10-CM

## 2019-08-28 PROCEDURE — 3008F BODY MASS INDEX DOCD: CPT | Performed by: FAMILY MEDICINE

## 2019-08-28 PROCEDURE — 99213 OFFICE O/P EST LOW 20 MIN: CPT | Performed by: FAMILY MEDICINE

## 2019-08-28 RX ORDER — ATOMOXETINE 40 MG/1
40 CAPSULE ORAL DAILY
Qty: 90 CAPSULE | Refills: 1 | Status: SHIPPED | OUTPATIENT
Start: 2019-08-28 | End: 2020-08-17 | Stop reason: SDUPTHER

## 2019-08-28 NOTE — PROGRESS NOTES
Assessment/Plan:         Diagnoses and all orders for this visit:    Attention deficit hyperactivity disorder (ADHD), combined type        adhd  Stable, discussed plan of care to continue current medications to quite well with meds no issues concerns in regard to side effects      Subjective:      Patient ID: Landry Yoder is a 61 y o  female  Here   [employer] works great , really help does its job   Has found it very effective over the years to maintain focus, both on and off the job  md child , glaucoma presently on medical mj , and is really doing well which has really helped enormously with the anxiety   Became a    Carlotta ,   Negative chest pain palpitations shortness of breath difficulty breathing cough lesions rash        The following portions of the patient's history were reviewed and updated as appropriate:   She has a past medical history of Basal cell carcinoma (BCC) of back  ,  does not have any pertinent problems on file  ,   has a past surgical history that includes Dilation and curettage of uterus; Leg Surgery (Left); Tubal ligation (1997); and Vein ligation and stripping ,  family history includes Diabetes in her family, father, and mother; Diverticulitis in her maternal aunt and mother; Hyperlipidemia in her family; Hypertension in her family, father, and mother; Mental illness in her brother and daughter; Osteoporosis in her maternal aunt and mother; Other in her paternal grandmother; Varicose Veins in her mother  ,   reports that she has quit smoking  She has never used smokeless tobacco  She reports that she drinks alcohol  She reports that she does not use drugs  ,  is allergic to penicillins and pollen extract         Review of Systems   Constitutional: Negative for appetite change, chills, fever and unexpected weight change     HENT: Negative for congestion, dental problem, ear pain, hearing loss, postnasal drip, rhinorrhea, sinus pressure, sinus pain, sneezing, sore throat, tinnitus and voice change  Eyes: Negative for visual disturbance  Respiratory: Negative for apnea, cough, chest tightness and shortness of breath  Cardiovascular: Negative for chest pain, palpitations and leg swelling  Gastrointestinal: Negative for abdominal pain, blood in stool, constipation, diarrhea, nausea and vomiting  Endocrine: Negative for cold intolerance, heat intolerance, polydipsia, polyphagia and polyuria  Genitourinary: Negative for decreased urine volume, difficulty urinating, dysuria, frequency and hematuria  Musculoskeletal: Negative for arthralgias, back pain, gait problem, joint swelling and myalgias  Skin: Negative for color change, rash and wound  Allergic/Immunologic: Negative for environmental allergies and food allergies  Neurological: Negative for dizziness, syncope, weakness, light-headedness, numbness and headaches  Hematological: Negative for adenopathy  Does not bruise/bleed easily  Psychiatric/Behavioral: Negative for sleep disturbance and suicidal ideas  The patient is not nervous/anxious  Objective:  Vitals:    08/28/19 1437   BP: 122/76   Pulse: 67   Temp: 98 °F (36 7 °C)   SpO2: 96%      Physical Exam   Constitutional: She is oriented to person, place, and time  She appears well-developed and well-nourished  No distress  HENT:   Head: Normocephalic and atraumatic  Eyes: EOM are normal    Neck: Normal range of motion  Neck supple  Cardiovascular: Normal rate, regular rhythm and normal heart sounds  Pulmonary/Chest: Effort normal and breath sounds normal    Musculoskeletal: Normal range of motion  Neurological: She is alert and oriented to person, place, and time  She has normal reflexes  Skin: Skin is warm and dry  Psychiatric: She has a normal mood and affect   Her behavior is normal  Judgment and thought content normal

## 2020-02-27 ENCOUNTER — TELEPHONE (OUTPATIENT)
Dept: FAMILY MEDICINE CLINIC | Facility: CLINIC | Age: 60
End: 2020-02-27

## 2020-03-24 DIAGNOSIS — Z12.11 COLON CANCER SCREENING: Primary | ICD-10-CM

## 2020-04-02 ENCOUNTER — TELEPHONE (OUTPATIENT)
Dept: FAMILY MEDICINE CLINIC | Facility: CLINIC | Age: 60
End: 2020-04-02

## 2020-08-17 ENCOUNTER — TELEMEDICINE (OUTPATIENT)
Dept: FAMILY MEDICINE CLINIC | Facility: CLINIC | Age: 60
End: 2020-08-17
Payer: COMMERCIAL

## 2020-08-17 DIAGNOSIS — F90.9 ATTENTION DEFICIT HYPERACTIVITY DISORDER (ADHD), UNSPECIFIED ADHD TYPE: ICD-10-CM

## 2020-08-17 DIAGNOSIS — L23.7 POISON IVY DERMATITIS: Primary | ICD-10-CM

## 2020-08-17 DIAGNOSIS — L03.311 CELLULITIS OF ABDOMINAL WALL: ICD-10-CM

## 2020-08-17 PROCEDURE — 1036F TOBACCO NON-USER: CPT | Performed by: NURSE PRACTITIONER

## 2020-08-17 PROCEDURE — 99213 OFFICE O/P EST LOW 20 MIN: CPT | Performed by: NURSE PRACTITIONER

## 2020-08-17 RX ORDER — METHYLPREDNISOLONE 4 MG/1
TABLET ORAL
Qty: 21 EACH | Refills: 0 | Status: SHIPPED | OUTPATIENT
Start: 2020-08-17 | End: 2021-03-04 | Stop reason: ALTCHOICE

## 2020-08-17 RX ORDER — SULFAMETHOXAZOLE AND TRIMETHOPRIM 800; 160 MG/1; MG/1
1 TABLET ORAL EVERY 12 HOURS SCHEDULED
Qty: 10 TABLET | Refills: 0 | Status: SHIPPED | OUTPATIENT
Start: 2020-08-17 | End: 2020-08-22

## 2020-08-17 RX ORDER — ATOMOXETINE 40 MG/1
40 CAPSULE ORAL DAILY
Qty: 90 CAPSULE | Refills: 1 | Status: SHIPPED | OUTPATIENT
Start: 2020-08-17 | End: 2021-02-05

## 2020-08-17 RX ORDER — CEPHALEXIN 500 MG/1
500 CAPSULE ORAL EVERY 6 HOURS SCHEDULED
Qty: 20 CAPSULE | Refills: 0 | Status: SHIPPED | OUTPATIENT
Start: 2020-08-17 | End: 2020-08-17

## 2020-08-17 NOTE — PROGRESS NOTES
Virtual Regular Visit       Assessment/Plan:    Problem List Items Addressed This Visit     None      Visit Diagnoses     Poison ivy dermatitis    -  Primary    Will use Medrol Dosepak  Relevant Medications    methylPREDNISolone 4 MG tablet therapy pack    Attention deficit hyperactivity disorder (ADHD), unspecified ADHD type        Relevant Medications    atoMOXetine (STRATTERA) 40 mg capsule    Cellulitis of abdominal wall        Will use 5 days of Bactrim  Relevant Medications    sulfamethoxazole-trimethoprim (BACTRIM DS) 800-160 mg per tablet               Reason for visit is   Chief Complaint   Patient presents with    Virtual Regular Visit        Encounter provider SUSHILA Crane    Provider located at [address] [place] [id number] [phone]      Recent Visits  No visits were found meeting these conditions  Showing recent visits within past 7 days and meeting all other requirements     Today's Visits  Date Type Provider Dept   08/17/20 Telemedicine SUSHILA Juarez [facility] today's visits and meeting all other requirements     Future Appointments  No visits were found meeting these conditions  Showing future appointments within next 150 days and meeting all other requirements        The patient was identified by name and date of birth  Sarah Turner was informed that this is a telemedicine visit and that the visit is being conducted through [address] [place] [address] and patient was informed that this is not a secure, HIPAA-complaint platform  She agrees to proceed     My office door was closed  No one else was in the room  She acknowledged consent and understanding of privacy and security of the video platform  The patient has agreed to participate and understands they can discontinue the visit at any time  Patient is aware this is a billable service  Subjective  Roosevelt Alvarado is a 61 y o  female presenting via face time for poison ivy and bee sting  Patient states that she was pulling up grass at her neighbor's house and got into poison ivy  She states that is on her face, eyes, headache, legs, arms and feet  It is extremely itchy  She also was stung by a bee yesterday on her abdomen and it is now red, tender and warm  She is using tea tree oil on the affected areas  She is also requesting a refill of her Strattera  She states that works very well  HPI     Past Medical History:   Diagnosis Date    Basal cell carcinoma (BCC) of back        Past Surgical History:   Procedure Laterality Date    DILATION AND CURETTAGE OF UTERUS      LEG SURGERY Left     1998 BY DR Neena Mendez; 2005 (AROUND) BY A DR AT [address] [place] [address]    VEIN LIGATION AND STRIPPING      + SKIN GRAFT OR VENOUS INTERRUPTION; BY DR Kaiden SU        Current Outpatient Medications   Medication Sig Dispense Refill    atoMOXetine (STRATTERA) 40 mg capsule Take 1 capsule (40 mg total) by mouth daily 90 capsule 1    LUMIFY 0 025 % SOLN 1 DROP ON TOP OF EACH EYE 3 TIMES A DAY  3    methylPREDNISolone 4 MG tablet therapy pack Use as directed on package 21 each 0    Multiple Vitamin tablet Take by mouth      sulfamethoxazole-trimethoprim (BACTRIM DS) 800-160 mg per tablet Take 1 tablet by mouth every 12 (twelve) hours for 5 days 10 tablet 0     No current facility-administered medications for this visit  Allergies   Allergen Reactions    Penicillins     Pollen Extract        Review of Systems   Constitutional: Negative for chills, fatigue and fever  HENT: Negative  Respiratory: Negative for chest tightness, shortness of breath and wheezing  Cardiovascular: Negative for chest pain and palpitations  Gastrointestinal: Negative for abdominal pain, constipation, diarrhea, nausea and vomiting     Genitourinary: Negative for difficulty urinating, flank pain, frequency, hematuria, pelvic pain and urgency  Musculoskeletal: Negative  Skin: Positive for rash  Neurological: Negative for dizziness, light-headedness, numbness and headaches  Psychiatric/Behavioral: Positive for decreased concentration  Negative for dysphoric mood and sleep disturbance  The patient is hyperactive  The patient is not nervous/anxious  Video Exam    There were no vitals filed for this visit  Physical Exam  Constitutional:       General: She is not in acute distress  Pulmonary:      Effort: Pulmonary effort is normal    Skin:     Findings: Erythema (left lower abdomen) and rash (diffuse) present  Neurological:      Mental Status: She is alert and oriented to person, place, and time  I spent 10 minutes directly with the patient during this visit      VIRTUAL VISIT DISCLAIMER    Radha Brown acknowledges that she has consented to an online visit or consultation  She understands that the online visit is based solely on information provided by her, and that, in the absence of a face-to-face physical evaluation by the physician, the diagnosis she receives is both limited and provisional in terms of accuracy and completeness  This is not intended to replace a full medical face-to-face evaluation by the physician  Radha Brown understands and accepts these terms

## 2020-10-02 DIAGNOSIS — Z12.31 SCREENING MAMMOGRAM FOR HIGH-RISK PATIENT: Primary | ICD-10-CM

## 2020-10-13 DIAGNOSIS — Z00.00 HEALTHCARE MAINTENANCE: Primary | ICD-10-CM

## 2020-10-22 DIAGNOSIS — Z12.31 SCREENING MAMMOGRAM FOR HIGH-RISK PATIENT: ICD-10-CM

## 2021-01-11 ENCOUNTER — TELEMEDICINE (OUTPATIENT)
Dept: FAMILY MEDICINE CLINIC | Facility: CLINIC | Age: 61
End: 2021-01-11
Payer: COMMERCIAL

## 2021-01-11 ENCOUNTER — TELEPHONE (OUTPATIENT)
Dept: OTHER | Facility: OTHER | Age: 61
End: 2021-01-11

## 2021-01-11 VITALS — TEMPERATURE: 99 F

## 2021-01-11 DIAGNOSIS — J02.9 SORE THROAT: ICD-10-CM

## 2021-01-11 DIAGNOSIS — R05.9 COUGH: ICD-10-CM

## 2021-01-11 DIAGNOSIS — R50.9 FEVER, UNSPECIFIED FEVER CAUSE: ICD-10-CM

## 2021-01-11 DIAGNOSIS — Z20.822 EXPOSURE TO COVID-19 VIRUS: Primary | ICD-10-CM

## 2021-01-11 PROCEDURE — 99214 OFFICE O/P EST MOD 30 MIN: CPT | Performed by: FAMILY MEDICINE

## 2021-01-11 PROCEDURE — U0005 INFEC AGEN DETEC AMPLI PROBE: HCPCS | Performed by: FAMILY MEDICINE

## 2021-01-11 PROCEDURE — U0003 INFECTIOUS AGENT DETECTION BY NUCLEIC ACID (DNA OR RNA); SEVERE ACUTE RESPIRATORY SYNDROME CORONAVIRUS 2 (SARS-COV-2) (CORONAVIRUS DISEASE [COVID-19]), AMPLIFIED PROBE TECHNIQUE, MAKING USE OF HIGH THROUGHPUT TECHNOLOGIES AS DESCRIBED BY CMS-2020-01-R: HCPCS | Performed by: FAMILY MEDICINE

## 2021-01-11 NOTE — PROGRESS NOTES
COVID-19 Virtual Visit     Assessment/Plan:    Problem List Items Addressed This Visit     None      Visit Diagnoses     Exposure to COVID-19 virus    -  Primary    Will get covid testing and discussed covid restrictions  Quarantine, masks, avoid family and friends  Take vitamin d3, zinc, vitamin c, cough meds  Relevant Orders    Novel Coronavirus (COVID-19), PCR [facility] - Collected in Office    Fever, unspecified fever cause        Tylenol as needed for fever    Relevant Orders    Novel Coronavirus (COVID-19), PCR [facility] - Collected in Office    Cough        Cough meds otc, mucinex dm bid  Plenty of liquids  Relevant Orders    Novel Coronavirus (COVID-19), PCR [facility] - Collected in Office    Sore throat        Cold benji lozenges  Relevant Orders    Novel Coronavirus (COVID-19), PCR [facility] - Collected in Office         Disposition:     I recommended the patient to come to our office to perform PCR testing for COVID-19  I have spent 20 minutes directly with the patient  Greater than 50% of this time was spent in counseling/coordination of care regarding: instructions for management, importance of treatment compliance, risk factor reductions and impressions  Encounter provider SUSHILA Mora    Provider located at [facility] [address] [address] [phone]    Recent Visits  No visits were found meeting these conditions  Showing recent visits within past 7 days and meeting all other requirements     Today's Visits  Date Type Provider Dept   01/11/21 Telemedicine Antonette Sabas Babinski, [address] today's visits and meeting all other requirements     Future Appointments  No visits were found meeting these conditions     Showing future appointments within next 150 days and meeting all other requirements      This virtual check-in was done via [employer] Du[employer] and patient was informed that this is not a secure, HIPAA-compliant platform  She agrees to proceed  Patient agrees to participate in a virtual check in via telephone or video visit instead of presenting to the office to address urgent/immediate medical needs  Patient is aware this is a billable service  After connecting through [facility], the patient was identified by name and date of birth  Laureen Bass was informed that this was a telemedicine visit and that the exam was being conducted confidentially over secure lines  My office door was closed  No one else was in the room  Laureen Bass acknowledged consent and understanding of privacy and security of the telemedicine visit  I informed the patient that I have reviewed her record in Epic and presented the opportunity for her to ask any questions regarding the visit today  The patient agreed to participate  Subjective:   Laureen Bass is a 61 y o  female who is concerned about COVID-19  Patient's symptoms include fever, fatigue, malaise, rhinorrhea, chest tightness, nausea (but not all the time  ), diarrhea, myalgias and headache  Patient denies chills, congestion, sore throat, anosmia, loss of taste, cough, shortness of breath and vomiting       Date of symptom onset: 1/7/2021    Exposure:   Contact with a person who is under investigation (PUI) for or who is positive for COVID-19 within the last 14 days?: Yes    Hospitalized recently for fever and/or lower respiratory symptoms?: No      Currently a healthcare worker that is involved in direct patient care?: No      Works in a special setting where the risk of COVID-19 transmission may be high? (this may include long-term care, correctional and shelter facilities; homeless shelters; assisted-living facilities and group homes ): No      Resident in a special setting where the risk of COVID-19 transmission may be high? (this may include long-term care, correctional and shelter facilities; homeless shelters; assisted-living facilities and group homes ): No      Pt's  tested positive for covid last week  Developed symptoms last Thursday  Treating the symptoms at home  No results found for: Bonnie Acevedo, SARSCORONAVI, CORONAVIRUSR  Past Medical History:   Diagnosis Date    Basal cell carcinoma (BCC) of back      Past Surgical History:   Procedure Laterality Date    DILATION AND CURETTAGE OF UTERUS      LEG SURGERY Left     1998 BY DR Sara Acevedo; 2005 (AROUND) BY A DR AT [address] [place] [address]    VEIN LIGATION AND STRIPPING      + SKIN GRAFT OR VENOUS INTERRUPTION; BY DR Raul SU      Current Outpatient Medications   Medication Sig Dispense Refill    atoMOXetine (STRATTERA) 40 mg capsule Take 1 capsule (40 mg total) by mouth daily 90 capsule 1    LUMIFY 0 025 % SOLN 1 DROP ON TOP OF EACH EYE 3 TIMES A DAY  3    methylPREDNISolone 4 MG tablet therapy pack Use as directed on package 21 each 0    Multiple Vitamin tablet Take by mouth       No current facility-administered medications for this visit  Allergies   Allergen Reactions    Penicillins     Pollen Extract        Review of Systems   Constitutional: Positive for fatigue and fever  Negative for chills  HENT: Positive for rhinorrhea  Negative for congestion and sore throat  Respiratory: Positive for chest tightness  Negative for cough and shortness of breath  Gastrointestinal: Positive for diarrhea and nausea (but not all the time  )  Negative for vomiting  Musculoskeletal: Positive for myalgias  Neurological: Positive for headaches  Objective:    Vitals:    01/11/21 0741   Temp: 99 °F (37 2 °C)       Physical Exam  Constitutional:       General: She is not in acute distress  Appearance: Normal appearance  She is well-developed  She is not ill-appearing, toxic-appearing or diaphoretic  HENT:      Head: Normocephalic and atraumatic  Nose: Nose normal  No rhinorrhea     Eyes: General:         Right eye: No discharge  Left eye: No discharge  Conjunctiva/sclera: Conjunctivae normal    Neck:      Musculoskeletal: Normal range of motion and neck supple  Pulmonary:      Effort: Pulmonary effort is normal  No respiratory distress  Musculoskeletal: Normal range of motion  General: No deformity  Skin:     Coloration: Skin is not pale  Findings: No erythema  Neurological:      General: No focal deficit present  Mental Status: She is alert and oriented to person, place, and time  Psychiatric:         Mood and Affect: Mood normal          Behavior: Behavior normal          Thought Content: Thought content normal          Judgment: Judgment normal      Wrap up: Reviewed all with patient  Will test for COVID this morning at our office at 11:00 a m  Instructions given  For now, I do suspect you have COVID-19 infection  Treat the symptoms  Mucinex DM twice daily for the cough  Tylenol or Advil as needed for the body aches  I do suggest vitamin D3 2000 units daily, zinc 50 mg daily, vitamin C over-the-counter, and plenty of liquids  Must avoid family and friends and continue to wear a mask in the home around to your mother-in-law who is almost 8 years old  Will follow on Thursday if this test is positive  Call sooner if worse  VIRTUAL VISIT DISCLAIMER    Christiano Brown acknowledges that she has consented to an online visit or consultation  She understands that the online visit is based solely on information provided by her, and that, in the absence of a face-to-face physical evaluation by the physician, the diagnosis she receives is both limited and provisional in terms of accuracy and completeness  This is not intended to replace a full medical face-to-face evaluation by the physician  Christiano Brown understands and accepts these terms

## 2021-01-11 NOTE — TELEPHONE ENCOUNTER
Pt   thought she had a virtual appointment at [address] [place] [address] and would like to get tested right away

## 2021-01-11 NOTE — PATIENT INSTRUCTIONS
Reviewed all with patient  Will test for COVID this morning at our office at 11:00 a m  Instructions given  For now, I do suspect you have COVID-19 infection  Treat the symptoms  Mucinex DM twice daily for the cough  Tylenol or Advil as needed for the body aches  I do suggest vitamin D3 2000 units daily, zinc 50 mg daily, vitamin C over-the-counter, and plenty of liquids  Must avoid family and friends and continue to wear a mask in the home around to your mother-in-law who is almost 8 years old  Will follow on Thursday if this test is positive  Call sooner if worse

## 2021-01-12 ENCOUNTER — TELEPHONE (OUTPATIENT)
Dept: FAMILY MEDICINE CLINIC | Facility: CLINIC | Age: 61
End: 2021-01-12

## 2021-01-12 LAB — SARS-COV-2 RNA SPEC QL NAA+PROBE: DETECTED

## 2021-01-12 NOTE — TELEPHONE ENCOUNTER
Positive as expected  Maintain quarantine  Please set her up for virtual on Thursday  Treat the symptoms  Mucinex dm bid for cough, tylenol or advil for fever or body aches  Vitamin d3 2000 daily and zinc 50 mg daily with otc vitamin c  ER if difficulty breathing

## 2021-01-14 ENCOUNTER — TELEMEDICINE (OUTPATIENT)
Dept: FAMILY MEDICINE CLINIC | Facility: CLINIC | Age: 61
End: 2021-01-14
Payer: COMMERCIAL

## 2021-01-14 VITALS — OXYGEN SATURATION: 96 % | BODY MASS INDEX: 25.79 KG/M2 | WEIGHT: 160.5 LBS | HEIGHT: 66 IN | HEART RATE: 73 BPM

## 2021-01-14 DIAGNOSIS — U07.1 COVID-19 VIRUS INFECTION: Primary | ICD-10-CM

## 2021-01-14 PROCEDURE — 3725F SCREEN DEPRESSION PERFORMED: CPT | Performed by: FAMILY MEDICINE

## 2021-01-14 PROCEDURE — 3008F BODY MASS INDEX DOCD: CPT | Performed by: FAMILY MEDICINE

## 2021-01-14 PROCEDURE — 99213 OFFICE O/P EST LOW 20 MIN: CPT | Performed by: FAMILY MEDICINE

## 2021-01-14 PROCEDURE — 1036F TOBACCO NON-USER: CPT | Performed by: FAMILY MEDICINE

## 2021-01-14 NOTE — ASSESSMENT & PLAN NOTE
Symptoms are waxing And weaning per patient cough Mucinex DM twice daily, Tylenol or Advil as needed for aches and pains and headache  Stay on the vitamin D3 2000 daily, the zinc 50 mg weekly, and over-the-counter vitamin- C  Remember to hydrate and continue quarantine until cleared by me

## 2021-01-14 NOTE — PATIENT INSTRUCTIONS
Reviewed all with patient  Her symptoms are waxing and waning  At this time continue to treat the symptoms with Mucinex DM for the cough, Tylenol or Advil for aches and pains, continue to hydrate, vitamin D3 2000 units daily, zinc 50 mg per week, vitamin-C over-the-counter  Continue to monitor the pulse ox and if below 90 with shortness of breath or difficulty breathing will need the ER  I will call again on Monday

## 2021-01-14 NOTE — PROGRESS NOTES
COVID-19 Virtual Visit     Assessment/Plan:    Problem List Items Addressed This Visit        Other    COVID-19 virus infection - Primary     Symptoms are waxing And weaning per patient cough Mucinex DM twice daily, Tylenol or Advil as needed for aches and pains and headache  Stay on the vitamin D3 2000 daily, the zinc 50 mg weekly, and over-the-counter vitamin- C  Remember to hydrate and continue quarantine until cleared by me  Disposition:     I recommended continued isolation until at least 24 hours have passed since recovery defined as resolution of fever without the use of fever-reducing medications AND improvement in COVID symptoms AND 10 days have passed since onset of symptoms (or 10 days have passed since date of first positive viral diagnostic test for asymptomatic patients)  Advised to maintain quarantine at home  Rest when able  Will follow on Monday  Continue to check pulse ox  For less than 90 will need the er  I have spent 15 minutes directly with the patient  Greater than 50% of this time was spent in counseling/coordination of care regarding: instructions for management, patient and family education, importance of treatment compliance and impressions  Wrap up:  Reviewed all with patient  Her symptoms are waxing and waning  At this time continue to treat the symptoms with Mucinex DM for the cough, Tylenol or Advil for aches and pains, continue to hydrate, vitamin D3 2000 units daily, zinc 50 mg per week, vitamin-C over-the-counter  Continue to monitor the pulse ox and if below 90 with shortness of breath or difficulty breathing will need the ER  I will call again on Monday      Encounter provider Maik Adrian, [facility]    Provider located at [facility] [address]   [address]  [facility]  [phone]    Recent Visits  Date Type Provider Dept   01/12/21 Telephone Mallory Au Pg Kalen Agarwal [address]   01/11/21 Telemedicine Yadira Valentine, [address] [facility]   Showing recent visits within past 7 days and meeting all other requirements     Today's Visits  Date Type Provider Dept   01/14/21 Telemedicine SUSHILA [address] today's visits and meeting all other requirements     Future Appointments  No visits were found meeting these conditions  Showing future appointments within next 150 days and meeting all other requirements      This virtual check-in was done via [employer] Du[employer] and patient was informed that this is not a secure, HIPAA-compliant platform  She agrees to proceed  Patient agrees to participate in a virtual check in via telephone or video visit instead of presenting to the office to address urgent/immediate medical needs  Patient is aware this is a billable service  After connecting through [facility], the patient was identified by name and date of birth  Elio Engle was informed that this was a telemedicine visit and that the exam was being conducted confidentially over secure lines  My office door was closed  No one else was in the room  Elio Engle acknowledged consent and understanding of privacy and security of the telemedicine visit  I informed the patient that I have reviewed her record in Epic and presented the opportunity for her to ask any questions regarding the visit today  The patient agreed to participate  Subjective:   Elio Engle is a 61 y o  female who has been screened for COVID-19  Symptom change since last report: unchanged  Patient's symptoms include fatigue, malaise, nasal congestion, sore throat (mild), anosmia, loss of taste, cough, abdominal pain (mild), nausea, diarrhea (mild) and headache  Patient denies fever, chills, rhinorrhea, shortness of breath, chest tightness, vomiting and myalgias       Date of symptom onset: 1/7/2021  Date of positive COVID-19 PCR: 1/11/2021    Lab Results   Component Value Date    SARSCOV2 Detected (A) 01/11/2021     Past Medical History:   Diagnosis Date    Basal cell carcinoma (BCC) of back      Past Surgical History:   Procedure Laterality Date    DILATION AND CURETTAGE OF UTERUS      LEG SURGERY Left     1998 BY DR Oliva Ramirez; 2005 (AROUND) BY A DR AT [address] [place] [address]    VEIN LIGATION AND STRIPPING      + SKIN GRAFT OR VENOUS INTERRUPTION; BY DR Shankar SU      Current Outpatient Medications   Medication Sig Dispense Refill    atoMOXetine (STRATTERA) 40 mg capsule Take 1 capsule (40 mg total) by mouth daily 90 capsule 1    LUMIFY 0 025 % SOLN 1 DROP ON TOP OF EACH EYE 3 TIMES A DAY  3    methylPREDNISolone 4 MG tablet therapy pack Use as directed on package 21 each 0    Multiple Vitamin tablet Take by mouth       No current facility-administered medications for this visit  Allergies   Allergen Reactions    Penicillins     Pollen Extract        Review of Systems   Constitutional: Positive for fatigue  Negative for chills and fever  + malaise   HENT: Positive for congestion and sore throat (mild)  Negative for rhinorrhea  Respiratory: Positive for cough  Negative for chest tightness and shortness of breath  Gastrointestinal: Positive for abdominal pain (mild), diarrhea (mild) and nausea  Negative for vomiting  Genitourinary: Negative  Musculoskeletal: Negative for myalgias  Neurological: Positive for headaches  Psychiatric/Behavioral: Negative for dysphoric mood  The patient is not nervous/anxious  Objective:    Vitals:    01/14/21 0918   Pulse: 73   SpO2: 96%   Weight: 72 8 kg (160 lb 8 oz)   Height: 5' 6" (1 676 m)       Physical Exam  Vitals signs reviewed  Constitutional:       General: She is not in acute distress  Appearance: Normal appearance  She is well-developed  She is not ill-appearing, toxic-appearing or diaphoretic     HENT:      Head: Normocephalic and atraumatic  Nose: Nose normal  No rhinorrhea  Eyes:      General:         Right eye: No discharge  Left eye: No discharge  Conjunctiva/sclera: Conjunctivae normal    Neck:      Musculoskeletal: Normal range of motion and neck supple  Pulmonary:      Effort: Pulmonary effort is normal  No respiratory distress  Comments: No cough during this visit  Musculoskeletal: Normal range of motion  General: No deformity  Skin:     Coloration: Skin is not pale  Findings: No erythema  Neurological:      General: No focal deficit present  Mental Status: She is alert and oriented to person, place, and time  Psychiatric:         Mood and Affect: Mood normal          Behavior: Behavior normal          Thought Content: Thought content normal          Judgment: Judgment normal        VIRTUAL VISIT DISCLAIMER    Laureen Bass acknowledges that she has consented to an online visit or consultation  She understands that the online visit is based solely on information provided by her, and that, in the absence of a face-to-face physical evaluation by the physician, the diagnosis she receives is both limited and provisional in terms of accuracy and completeness  This is not intended to replace a full medical face-to-face evaluation by the physician  Laureen Bass understands and accepts these terms  BMI Counseling: Body mass index is 25 91 kg/m²  The BMI is above normal  Nutrition recommendations include reducing portion sizes, 3-5 servings of fruits/vegetables daily and moderation in carbohydrate intake  Exercise recommendations include moderate aerobic physical activity for 150 minutes/week

## 2021-02-03 DIAGNOSIS — F90.9 ATTENTION DEFICIT HYPERACTIVITY DISORDER (ADHD), UNSPECIFIED ADHD TYPE: ICD-10-CM

## 2021-02-05 RX ORDER — ATOMOXETINE 40 MG/1
CAPSULE ORAL
Qty: 90 CAPSULE | Refills: 0 | Status: SHIPPED | OUTPATIENT
Start: 2021-02-05 | End: 2021-03-04 | Stop reason: SDUPTHER

## 2021-03-04 ENCOUNTER — OFFICE VISIT (OUTPATIENT)
Dept: FAMILY MEDICINE CLINIC | Facility: CLINIC | Age: 61
End: 2021-03-04
Payer: COMMERCIAL

## 2021-03-04 VITALS
BODY MASS INDEX: 26.03 KG/M2 | WEIGHT: 162 LBS | DIASTOLIC BLOOD PRESSURE: 76 MMHG | HEIGHT: 66 IN | TEMPERATURE: 97.8 F | SYSTOLIC BLOOD PRESSURE: 126 MMHG

## 2021-03-04 DIAGNOSIS — F90.2 ATTENTION DEFICIT HYPERACTIVITY DISORDER (ADHD), COMBINED TYPE: Primary | ICD-10-CM

## 2021-03-04 DIAGNOSIS — E78.2 MIXED HYPERLIPIDEMIA: ICD-10-CM

## 2021-03-04 DIAGNOSIS — Z11.4 SCREENING FOR HIV (HUMAN IMMUNODEFICIENCY VIRUS): ICD-10-CM

## 2021-03-04 DIAGNOSIS — Z11.59 ENCOUNTER FOR HEPATITIS C SCREENING TEST FOR LOW RISK PATIENT: ICD-10-CM

## 2021-03-04 DIAGNOSIS — F41.9 ANXIETY: ICD-10-CM

## 2021-03-04 DIAGNOSIS — B07.0 PLANTAR WART, LEFT FOOT: ICD-10-CM

## 2021-03-04 DIAGNOSIS — F90.9 ATTENTION DEFICIT HYPERACTIVITY DISORDER (ADHD), UNSPECIFIED ADHD TYPE: ICD-10-CM

## 2021-03-04 PROCEDURE — 1036F TOBACCO NON-USER: CPT | Performed by: FAMILY MEDICINE

## 2021-03-04 PROCEDURE — 99214 OFFICE O/P EST MOD 30 MIN: CPT | Performed by: FAMILY MEDICINE

## 2021-03-04 RX ORDER — ATOMOXETINE 40 MG/1
40 CAPSULE ORAL DAILY
Qty: 90 CAPSULE | Refills: 1 | Status: SHIPPED | OUTPATIENT
Start: 2021-03-04 | End: 2021-09-07 | Stop reason: SDUPTHER

## 2021-03-04 NOTE — PROGRESS NOTES
Assessment/Plan:     Chronic Problems:  Attention deficit hyperactivity disorder (ADHD), combined type  Stay on the current dose of meds  Mixed hyperlipidemia  Advised to have the labs done  Anxiety  Doing well on medical marijuana  Continue the same  Visit Diagnosis:  Diagnoses and all orders for this visit:    Attention deficit hyperactivity disorder (ADHD), combined type    Mixed hyperlipidemia    Plantar wart, left foot  Comments:  Keep using the wart pad  I believe this will fall off  Attention deficit hyperactivity disorder (ADHD), unspecified ADHD type  -     atoMOXetine (STRATTERA) 40 mg capsule; Take 1 capsule (40 mg total) by mouth daily    Screening for HIV (human immunodeficiency virus)  -     HIV 1/2 Antigen/Antibody (4th Generation) w Reflex [facility]; Future    Encounter for hepatitis C screening test for low risk patient  -     Hepatitis C antibody; Future    Anxiety          Subjective:    Patient ID: Juan C London is a 61 y o  female  Pt is here for f/u on her Strattera  No longer using buspar and using medical marijuana instead  Was on it for glaucoma, but now for anxiety  Feels it works well on anxiety and helps with her sleep at night  Feels the Strattera works well at this dose  Walks 4 to 6 miles daily  Never had labs done  Wants her covid shot  Takes all other meds as directed  No side effects noted  The following portions of the patient's history were reviewed and updated as appropriate: allergies, current medications, past family history, past medical history, past social history, past surgical history and problem list     Review of Systems   Constitutional: Negative for chills, diaphoresis, fatigue and fever  HENT: Positive for hearing loss (mild since covid  )  Negative for sinus pressure, sinus pain, sneezing and sore throat  Eyes: Negative  Respiratory: Negative for cough, shortness of breath and wheezing      Cardiovascular: Negative for chest pain and palpitations  Gastrointestinal: Negative for abdominal pain, constipation, diarrhea, nausea and vomiting  Genitourinary: Negative for dysuria, frequency and urgency  Does Kegal exercises for urinary incontinence  Neurological: Negative for dizziness, light-headedness and headaches  Psychiatric/Behavioral: Negative for dysphoric mood  The patient is nervous/anxious            /76   Temp 97 8 °F (36 6 °C)   Ht 5' 6" (1 676 m)   Wt 73 5 kg (162 lb)   BMI 26 15 kg/m²   Social History     Socioeconomic History    Marital status: /Civil Union     Spouse name: Not on file    Number of children: 3    Years of education: Not on file    Highest education level: Not on file   Occupational History    Not on file   Social Needs    Financial resource strain: Not on file    Food insecurity     Worry: Not on file     Inability: Not on file   Persian PollitoIngles needs     Medical: Not on file     Non-medical: Not on file   Tobacco Use    Smoking status: Former Smoker    Smokeless tobacco: Never Used   Substance and Sexual Activity    Alcohol use: Yes     Comment: CONSUMES ALCOHOL AT SOCIAL EVENTS; SOCIAL USE     Drug use: No    Sexual activity: Not on file   Lifestyle    Physical activity     Days per week: Not on file     Minutes per session: Not on file    Stress: Not on file   Relationships    Social connections     Talks on phone: Not on file     Gets together: Not on file     Attends Oriental orthodox service: Not on file     Active member of club or organization: Not on file     Attends meetings of clubs or organizations: Not on file     Relationship status: Not on file    Intimate partner violence     Fear of current or ex partner: Not on file     Emotionally abused: Not on file     Physically abused: Not on file     Forced sexual activity: Not on file   Other Topics Concern    Not on file   Social History Narrative    Always uses seat belt    Daily caffeine consumption    Full-time employment     Past Medical History:   Diagnosis Date    Basal cell carcinoma (BCC) of back      Family History   Problem Relation Age of Onset    Diverticulitis Mother     Diabetes Mother     Hypertension Mother     Osteoporosis Mother     Varicose Veins Mother     Diabetes Father     Hypertension Father     Mental illness Brother     Other Paternal Grandmother         BREAST DISORDER    Diverticulitis Maternal Aunt     Osteoporosis Maternal Aunt     Diabetes Family     Hyperlipidemia Family     Hypertension Family     Mental illness Daughter      Past Surgical History:   Procedure Laterality Date    DILATION AND CURETTAGE OF UTERUS      LEG SURGERY Left     1998 BY DR Celina Elkins; 2005 (AROUND) BY A DR AT [address] [place] [address]    VEIN LIGATION AND STRIPPING      + SKIN GRAFT OR VENOUS INTERRUPTION; BY DR Joshua SU        Current Outpatient Medications:     atoMOXetine (STRATTERA) 40 mg capsule, Take 1 capsule (40 mg total) by mouth daily, Disp: 90 capsule, Rfl: 1    LUMIFY 0 025 % SOLN, 1 DROP ON TOP OF EACH EYE 3 TIMES A DAY, Disp: , Rfl: 3    Multiple Vitamin tablet, Take by mouth, Disp: , Rfl:     Allergies   Allergen Reactions    Penicillins     Pollen Extract           Lab Review   Telemedicine on 01/11/2021   Component Date Value    SARS-CoV-2  01/11/2021 Detected*        Imaging: No results found  Objective:     Physical Exam  Vitals signs and nursing note reviewed  Constitutional:       General: She is not in acute distress  Appearance: She is well-developed  She is not diaphoretic  HENT:      Head: Normocephalic and atraumatic  Right Ear: External ear normal       Left Ear: External ear normal    Eyes:      General:         Right eye: No discharge  Left eye: No discharge  Conjunctiva/sclera: Conjunctivae normal       Pupils: Pupils are equal, round, and reactive to light     Neck:      Musculoskeletal: Normal range of motion and neck supple  Thyroid: No thyromegaly  Cardiovascular:      Rate and Rhythm: Normal rate and regular rhythm  Heart sounds: Normal heart sounds  No murmur  No friction rub  Pulmonary:      Effort: Pulmonary effort is normal  No respiratory distress  Breath sounds: Normal breath sounds  No wheezing or rales  Abdominal:      General: Bowel sounds are normal       Palpations: Abdomen is soft  Tenderness: There is no abdominal tenderness  Musculoskeletal: Normal range of motion  General: No tenderness or deformity  Lymphadenopathy:      Cervical: No cervical adenopathy  Skin:     General: Skin is warm and dry  Findings: No rash  Comments: 8 mm plantar wart on the left foot with wart pad in place  Appears macerated  Neurological:      Mental Status: She is alert and oriented to person, place, and time  Cranial Nerves: No cranial nerve deficit  Psychiatric:         Behavior: Behavior normal          Thought Content: Thought content normal          Judgment: Judgment normal            Patient Instructions   Reviewed all with patient  She is doing well on her 40 mg of Strattera so continue the same  Continue on the medical marijuana as this seems to be helping with the anxiety  Please have the lab work done I will call you with results  If the wart does not fall off follow-up here I will freeze it but it looks like it is getting ready to fall off with the wart pads  Continue on current medications follow-up here in 6 months for routine f/u and 3 weeks for pap smear  SUSHILA Coppola    Portions of the record may have been created with voice recognition software  Occasional wrong word or "sound a like" substitutions may have occurred due to the inherent limitations of voice recognition software  Read the chart carefully and recognize, using context, where substitutions have occurred

## 2021-03-04 NOTE — PATIENT INSTRUCTIONS
Reviewed all with patient  She is doing well on her 40 mg of Strattera so continue the same  Continue on the medical marijuana as this seems to be helping with the anxiety  Please have the lab work done I will call you with results  If the wart does not fall off follow-up here I will freeze it but it looks like it is getting ready to fall off with the wart pads  Continue on current medications follow-up here in 6 months for routine f/u and 3 weeks for pap smear

## 2021-03-16 LAB
ALBUMIN SERPL-MCNC: 4.6 G/DL (ref 3.8–4.9)
ALBUMIN/GLOB SERPL: 2.1 {RATIO} (ref 1.2–2.2)
ALP SERPL-CCNC: 73 IU/L (ref 39–117)
ALT SERPL-CCNC: 20 IU/L (ref 0–32)
AST SERPL-CCNC: 18 IU/L (ref 0–40)
BASOPHILS # BLD AUTO: 0 X10E3/UL (ref 0–0.2)
BASOPHILS NFR BLD AUTO: 1 %
BILIRUB SERPL-MCNC: 0.4 MG/DL (ref 0–1.2)
BUN SERPL-MCNC: 14 MG/DL (ref 8–27)
BUN/CREAT SERPL: 18 (ref 12–28)
CALCIUM SERPL-MCNC: 9.8 MG/DL (ref 8.7–10.3)
CHLORIDE SERPL-SCNC: 101 MMOL/L (ref 96–106)
CHOLEST SERPL-MCNC: 242 MG/DL (ref 100–199)
CO2 SERPL-SCNC: 25 MMOL/L (ref 20–29)
CREAT SERPL-MCNC: 0.8 MG/DL (ref 0.57–1)
EOSINOPHIL # BLD AUTO: 0.1 X10E3/UL (ref 0–0.4)
EOSINOPHIL NFR BLD AUTO: 2 %
ERYTHROCYTE [DISTWIDTH] IN BLOOD BY AUTOMATED COUNT: 12.5 % (ref 11.7–15.4)
GLOBULIN SER-MCNC: 2.2 G/DL (ref 1.5–4.5)
GLUCOSE SERPL-MCNC: 85 MG/DL (ref 65–99)
HCT VFR BLD AUTO: 43.9 % (ref 34–46.6)
HCV AB S/CO SERPL IA: <0.1 S/CO RATIO (ref 0–0.9)
HDLC SERPL-MCNC: 58 MG/DL
HGB BLD-MCNC: 14.3 G/DL (ref 11.1–15.9)
HIV 1+2 AB+HIV1 P24 AG SERPL QL IA: NON REACTIVE
IMM GRANULOCYTES # BLD: 0 X10E3/UL (ref 0–0.1)
IMM GRANULOCYTES NFR BLD: 0 %
LDLC SERPL CALC-MCNC: 163 MG/DL (ref 0–99)
LYMPHOCYTES # BLD AUTO: 2.4 X10E3/UL (ref 0.7–3.1)
LYMPHOCYTES NFR BLD AUTO: 39 %
MCH RBC QN AUTO: 28.8 PG (ref 26.6–33)
MCHC RBC AUTO-ENTMCNC: 32.6 G/DL (ref 31.5–35.7)
MCV RBC AUTO: 88 FL (ref 79–97)
MONOCYTES # BLD AUTO: 0.4 X10E3/UL (ref 0.1–0.9)
MONOCYTES NFR BLD AUTO: 6 %
NEUTROPHILS # BLD AUTO: 3.2 X10E3/UL (ref 1.4–7)
NEUTROPHILS NFR BLD AUTO: 52 %
PLATELET # BLD AUTO: 311 X10E3/UL (ref 150–450)
POTASSIUM SERPL-SCNC: 4.2 MMOL/L (ref 3.5–5.2)
PROT SERPL-MCNC: 6.8 G/DL (ref 6–8.5)
RBC # BLD AUTO: 4.97 X10E6/UL (ref 3.77–5.28)
SL AMB EGFR AFRICAN AMERICAN: 93 ML/MIN/1.73
SL AMB EGFR NON AFRICAN AMERICAN: 80 ML/MIN/1.73
SL AMB VLDL CHOLESTEROL CALC: 21 MG/DL (ref 5–40)
SODIUM SERPL-SCNC: 139 MMOL/L (ref 134–144)
TRIGL SERPL-MCNC: 119 MG/DL (ref 0–149)
TSH SERPL DL<=0.005 MIU/L-ACNC: 1.94 UIU/ML (ref 0.45–4.5)
WBC # BLD AUTO: 6.2 X10E3/UL (ref 3.4–10.8)

## 2021-04-01 ENCOUNTER — ANNUAL EXAM (OUTPATIENT)
Dept: FAMILY MEDICINE CLINIC | Facility: CLINIC | Age: 61
End: 2021-04-01
Payer: COMMERCIAL

## 2021-04-01 VITALS
SYSTOLIC BLOOD PRESSURE: 120 MMHG | TEMPERATURE: 96.8 F | WEIGHT: 159 LBS | HEIGHT: 66 IN | RESPIRATION RATE: 14 BRPM | HEART RATE: 64 BPM | BODY MASS INDEX: 25.55 KG/M2 | DIASTOLIC BLOOD PRESSURE: 86 MMHG | OXYGEN SATURATION: 99 %

## 2021-04-01 DIAGNOSIS — Z12.11 COLON CANCER SCREENING: ICD-10-CM

## 2021-04-01 DIAGNOSIS — N84.1 CERVICAL POLYP: ICD-10-CM

## 2021-04-01 DIAGNOSIS — Z01.419 ROUTINE GYNECOLOGICAL EXAMINATION: Primary | ICD-10-CM

## 2021-04-01 DIAGNOSIS — Z12.31 SCREENING MAMMOGRAM, ENCOUNTER FOR: ICD-10-CM

## 2021-04-01 PROCEDURE — 87624 HPV HI-RISK TYP POOLED RSLT: CPT | Performed by: FAMILY MEDICINE

## 2021-04-01 PROCEDURE — 3008F BODY MASS INDEX DOCD: CPT | Performed by: FAMILY MEDICINE

## 2021-04-01 PROCEDURE — G0145 SCR C/V CYTO,THINLAYER,RESCR: HCPCS | Performed by: FAMILY MEDICINE

## 2021-04-01 PROCEDURE — 99214 OFFICE O/P EST MOD 30 MIN: CPT | Performed by: FAMILY MEDICINE

## 2021-04-01 RX ORDER — BIMATOPROST 0.01 %
DROPS OPHTHALMIC (EYE)
COMMUNITY
Start: 2021-03-30

## 2021-04-01 NOTE — PATIENT INSTRUCTIONS
Reviewed all with patient  Will follow Pap smear  If abnormal I will send to Gynecology but you do have 2 very small cervical polyps  If this Pap is normal I would like to repeated in 3 years that would probably be your last Pap smear  Please do the stool specimen because next year you are due for a colonoscopy and we like to check in between years and you did not have the Cologuard done  Also schedule your mammogram for after October 21st of this year  Try to stay on at least 2000 units of vitamin D3 for your bones  Keep your normal follow-up here

## 2021-04-02 LAB
HPV HR 12 DNA CVX QL NAA+PROBE: NEGATIVE
HPV16 DNA CVX QL NAA+PROBE: NEGATIVE
HPV18 DNA CVX QL NAA+PROBE: NEGATIVE

## 2021-04-09 LAB
LAB AP GYN PRIMARY INTERPRETATION: NORMAL
LAB AP LMP: NORMAL
Lab: NORMAL

## 2021-04-29 ENCOUNTER — TELEPHONE (OUTPATIENT)
Dept: FAMILY MEDICINE CLINIC | Facility: CLINIC | Age: 61
End: 2021-04-29

## 2021-04-29 NOTE — TELEPHONE ENCOUNTER
Patient called wondering why there was an HIV test ordered on 3/4  She went and did it but it cost her over $100   She wants to know if there was something you seen that she was not aware of to need the test?

## 2021-05-18 ENCOUNTER — OFFICE VISIT (OUTPATIENT)
Dept: GASTROENTEROLOGY | Facility: CLINIC | Age: 61
End: 2021-05-18
Payer: COMMERCIAL

## 2021-05-18 VITALS
SYSTOLIC BLOOD PRESSURE: 120 MMHG | BODY MASS INDEX: 25.78 KG/M2 | DIASTOLIC BLOOD PRESSURE: 82 MMHG | HEART RATE: 82 BPM | WEIGHT: 160.4 LBS | HEIGHT: 66 IN

## 2021-05-18 DIAGNOSIS — K62.5 RECTAL BLEEDING: Primary | ICD-10-CM

## 2021-05-18 PROCEDURE — 3008F BODY MASS INDEX DOCD: CPT | Performed by: PHYSICIAN ASSISTANT

## 2021-05-18 PROCEDURE — 1036F TOBACCO NON-USER: CPT | Performed by: PHYSICIAN ASSISTANT

## 2021-05-18 PROCEDURE — 99203 OFFICE O/P NEW LOW 30 MIN: CPT | Performed by: PHYSICIAN ASSISTANT

## 2021-05-18 NOTE — PROGRESS NOTES
[address] [facility]  Azar Priscilla 61 y o  female MRN: [id number]       CC:  New patient to establish, previous patient of Dr Ruthann Jeffries for rectal bleeding    HPI: Nehemias Lockwood is a 57-year-old female who presents to the office today for rectal bleeding that has been ongoing for the past year  Patient reports that usually happens every several months and it will be mixed in with her stool  She denies melena or hematochezia  Her daughter who is going to [place], and her daughter who is a nurse encouraged her to come for follow-up  She reports that she had a colonoscopy with Dr Ruthann Jeffries  8 years ago  He had recommended a 10 year follow-up  She denies abdominal pain, diarrhea or unintentional weight loss  She reports that she notices the bright red blood occurring when she has a large bowel movement  Review of Systems:    CONSTITUTIONAL: Denies any fever, chills, or rigors  Good appetite, and no recent weight loss  HEENT: No earache or tinnitus  Denies hearing loss or visual disturbances  CARDIOVASCULAR: No chest pain or palpitations  RESPIRATORY: Denies any cough, hemoptysis, shortness of breath or dyspnea on exertion  GASTROINTESTINAL: As noted in the History of Present Illness  GENITOURINARY: No problems with urination  Denies any hematuria or dysuria  NEUROLOGIC: No dizziness or vertigo, denies headaches  MUSCULOSKELETAL: Denies any muscle or joint pain  SKIN: Denies skin rashes or itching  ENDOCRINE: Denies excessive thirst  Denies intolerance to heat or cold  PSYCHOSOCIAL: Denies depression or anxiety  Denies any recent memory loss         Current Outpatient Medications   Medication Sig Dispense Refill    atoMOXetine (STRATTERA) 40 mg capsule Take 1 capsule (40 mg total) by mouth daily 90 capsule 1    Cholecalciferol (Vitamin D3) 50 MCG (2000 UT) CHEW Chew      Lumigan 0 01 % ophthalmic drops       Multiple Vitamin tablet Take by mouth       No current facility-administered medications for this visit        Past Medical History:   Diagnosis Date    Basal cell carcinoma (BCC) of back      Past Surgical History:   Procedure Laterality Date    DILATION AND CURETTAGE OF UTERUS      LEG SURGERY Left     1998 BY DR Neena Mendez; 2005 (AROUND) BY [facility] DR AT [facility]    TUBAL LIGATION  1997    VEIN LIGATION AND STRIPPING      + SKIN GRAFT OR VENOUS INTERRUPTION; BY DR Kaiden SU      Social History     Socioeconomic History    Marital status: /Civil Union     Spouse name: None    Number of children: 3    Years of education: None    Highest education level: None   Occupational History    None   Social Needs    Financial resource strain: None    Food insecurity     Worry: None     Inability: None    Transportation needs     Medical: None     Non-medical: None   Tobacco Use    Smoking status: Former Smoker    Smokeless tobacco: Never Used   Substance and Sexual Activity    Alcohol use: Yes     Comment: CONSUMES ALCOHOL AT SOCIAL EVENTS; SOCIAL USE     Drug use: No    Sexual activity: None   Lifestyle    Physical activity     Days per week: None     Minutes per session: None    Stress: None   Relationships    Social connections     Talks on phone: None     Gets together: None     Attends Spiritism service: None     Active member of club or organization: None     Attends meetings of clubs or organizations: None     Relationship status: None    Intimate partner violence     Fear of current or ex partner: None     Emotionally abused: None     Physically abused: None     Forced sexual activity: None   Other Topics Concern    None   Social History Narrative    Always uses seat belt    Daily caffeine consumption    Full-time employment     Family History   Problem Relation Age of Onset    Diverticulitis Mother     Diabetes Mother     Hypertension Mother     Osteoporosis Mother     Varicose Veins Mother     Diabetes Father     Hypertension Father     Mental illness Brother     Other Paternal Grandmother         BREAST DISORDER    Diverticulitis Maternal Aunt     Osteoporosis Maternal Aunt     Diabetes Family     Hyperlipidemia Family     Hypertension Family     Mental illness Daughter             PHYSICAL EXAM:    Vitals:    05/18/21 1540   BP: 120/82   BP Location: Left arm   Patient Position: Sitting   Cuff Size: Standard   Pulse: 82   Weight: 72 8 kg (160 lb 6 4 oz)   Height: 5' 6" (1 676 m)     General Appearance:   Alert and oriented x 3  Cooperative, and in no respiratory distress   HEENT:   Normocephalic, atraumatic, anicteric      Neck:  Supple, symmetrical, trachea midline   Lungs:   Clear to auscultation bilaterally    Heart[de-identified]   Regular rate and rhythm   Abdomen:   Soft, non-tender, non-distended; normal bowel sounds; no masses, no organomegaly    Genitalia:   Deferred    Rectal:   Deferred    Extremities:  No cyanosis, clubbing or edema    Pulses:  2+ and symmetric all extremities    Skin:  Skin color, texture, turgor normal, no rashes or lesions    Lymph nodes:  No palpable cervical or supraclavicular lymphadenopathy        Lab Results:   Results from last 6 Months   Lab Units 03/13/21  0904   WHITE BLOOD CELL COUNT  x10E3/uL 6 2   HEMOGLOBIN  g/dL 14 3   HEMATOCRIT  % 43 9   PLATELETS  X75B2/   NEUTROS PCT  % 52   LYMPHS PCT  % 39   MONOS PCT  % 6   EOS PCT  % 2     Results from last 6 Months   Lab Units 03/13/21  0904   POTASSIUM mmol/L 4 2   CHLORIDE mmol/L 101   CO2 mmol/L 25   BUN mg/dL 14   CREATININE mg/dL 0 80   ALT IU/L 20   AST IU/L 18               Imaging Studies: I have personally reviewed pertinent imaging studies  No results found  ASSESSMENT and PLAN:      1)  Rectal bleeding -  Intermittent in nature, mixed in with the stool  No abdominal pain  Her last colonoscopy was greater than 5 years ago  She had a recent CBC that was normal, fortunately    -   Colonoscopy to investigate  -  I offered patient Anusol suppositories, but she is deferring at this time until colonoscopy is performed        Follow up as needed

## 2021-06-23 ENCOUNTER — TELEPHONE (OUTPATIENT)
Dept: GASTROENTEROLOGY | Facility: CLINIC | Age: 61
End: 2021-06-23

## 2021-07-16 ENCOUNTER — TELEPHONE (OUTPATIENT)
Dept: GASTROENTEROLOGY | Facility: CLINIC | Age: 61
End: 2021-07-16

## 2021-07-16 NOTE — TELEPHONE ENCOUNTER
Patient called to reschedule her colonoscopy to 11/11 from 8/10 as she didn't want to wait for the procedure til the afternoon

## 2021-08-27 ENCOUNTER — RA CDI HCC (OUTPATIENT)
Dept: OTHER | Facility: HOSPITAL | Age: 61
End: 2021-08-27

## 2021-08-27 NOTE — PROGRESS NOTES
[facility]  coding opportunities       Chart reviewed, no opportunity found: CHART REVIEWED, NO OPPORTUNITY FOUND                        Patients insurance company:  [employer] [facility] (Medicare Advantage and Commercial)

## 2021-09-07 ENCOUNTER — OFFICE VISIT (OUTPATIENT)
Dept: FAMILY MEDICINE CLINIC | Facility: CLINIC | Age: 61
End: 2021-09-07
Payer: COMMERCIAL

## 2021-09-07 VITALS
BODY MASS INDEX: 25.71 KG/M2 | WEIGHT: 160 LBS | SYSTOLIC BLOOD PRESSURE: 126 MMHG | DIASTOLIC BLOOD PRESSURE: 78 MMHG | HEART RATE: 69 BPM | HEIGHT: 66 IN | OXYGEN SATURATION: 97 % | TEMPERATURE: 98.2 F

## 2021-09-07 DIAGNOSIS — F90.9 ATTENTION DEFICIT HYPERACTIVITY DISORDER (ADHD), UNSPECIFIED ADHD TYPE: Primary | ICD-10-CM

## 2021-09-07 DIAGNOSIS — Z13.220 SCREENING, LIPID: ICD-10-CM

## 2021-09-07 PROCEDURE — 1036F TOBACCO NON-USER: CPT | Performed by: FAMILY MEDICINE

## 2021-09-07 PROCEDURE — 99214 OFFICE O/P EST MOD 30 MIN: CPT | Performed by: FAMILY MEDICINE

## 2021-09-07 PROCEDURE — 3008F BODY MASS INDEX DOCD: CPT | Performed by: FAMILY MEDICINE

## 2021-09-07 PROCEDURE — 3725F SCREEN DEPRESSION PERFORMED: CPT | Performed by: FAMILY MEDICINE

## 2021-09-07 RX ORDER — ONDANSETRON 4 MG/1
TABLET, ORALLY DISINTEGRATING ORAL
COMMUNITY
Start: 2021-08-18

## 2021-09-07 RX ORDER — ATOMOXETINE 40 MG/1
40 CAPSULE ORAL DAILY
Qty: 90 CAPSULE | Refills: 1 | Status: SHIPPED | OUTPATIENT
Start: 2021-09-07 | End: 2022-06-08 | Stop reason: ALTCHOICE

## 2021-09-07 NOTE — PROGRESS NOTES
Assessment/Plan:     Chronic Problems:  Attention deficit hyperactivity disorder (ADHD), combined type  Pt is doing well on her strattera  Continue the same  Visit Diagnosis:  Diagnoses and all orders for this visit:    Attention deficit hyperactivity disorder (ADHD), unspecified ADHD type  -     atoMOXetine (STRATTERA) 40 mg capsule; Take 1 capsule (40 mg total) by mouth daily    Screening, lipid  Comments:  Have the labs done prior to the next appt  Orders:  -     Comprehensive metabolic panel; Future  -     Lipid panel; Future  -     Urinalysis with microscopic  -     Microalbumin / creatinine urine ratio    Other orders  -     ondansetron (ZOFRAN-ODT) 4 mg disintegrating tablet; TAKE 1 TAB BY MOUTH EVERY 6 HOURS AS NEEDED FOR NAUSEA OR VOMITING          Subjective:    Patient ID: Reanna Patino is a 64 y o  female  Pt is here for routine f/u appt  Had both of her covid vaccines  Having her first grandchild  Due for her mammo in November  Having her colonoscopy on November 11th with Dr Serge Richter  Still on strattera and doing well  On medical marijuana for her anxiety  Does yoga  No complaints today  Takes all other meds as directed  No side effects noted  The following portions of the patient's history were reviewed and updated as appropriate: allergies, current medications, past family history, past medical history, past social history, past surgical history and problem list     Review of Systems   Constitutional: Negative for chills, diaphoresis, fatigue and fever  HENT: Negative  Eyes: Negative  Respiratory: Negative for cough, shortness of breath and wheezing  Cardiovascular: Negative for chest pain and palpitations  Gastrointestinal: Negative for abdominal pain, diarrhea, nausea and vomiting  Every now and then she feels her stomach swells  Noticed blood at the end of her bm's  Now happening more frequently  Not painful and no known hemorrhoids      Genitourinary: Negative  Neurological: Negative for dizziness, light-headedness and headaches  Psychiatric/Behavioral: Negative for dysphoric mood  The patient is not nervous/anxious  /78   Pulse 69   Temp 98 2 °F (36 8 °C)   Ht 5' 6" (1 676 m)   Wt 72 6 kg (160 lb)   SpO2 97%   BMI 25 82 kg/m²   Social History     Socioeconomic History    Marital status: /Civil Union     Spouse name: Not on file    Number of children: 3    Years of education: Not on file    Highest education level: Not on file   Occupational History    Not on file   Tobacco Use    Smoking status: Former Smoker    Smokeless tobacco: Never Used   Substance and Sexual Activity    Alcohol use: Yes     Comment: CONSUMES ALCOHOL AT SOCIAL EVENTS; SOCIAL USE     Drug use: No    Sexual activity: Not on file   Other Topics Concern    Not on file   Social History Narrative    Always uses seat belt    Daily caffeine consumption    Full-time employment     Social Determinants of Health     Financial Resource Strain:     Difficulty of Paying Living Expenses:    Food Insecurity:     Worried About [address] in the Last Year:     [address] [employer] in the Last Year:    Transportation Needs:     Lack of Transportation (Medical):      Lack of Transportation (Non-Medical):    Physical Activity:     Days of Exercise per Week:     Minutes of Exercise per Session:    Stress:     Feeling of Stress :    Social Connections:     Frequency of Communication with Friends and Family:     Frequency of Social Gatherings with Friends and Family:     Attends Advent Services:     Active Member of Clubs or Organizations:     Attends Club or Organization Meetings:     Marital Status:    Intimate Partner Violence:     Fear of Current or Ex-Partner:     Emotionally Abused:     Physically Abused:     Sexually Abused:      Past Medical History:   Diagnosis Date    Basal cell carcinoma (BCC) of back      Family History   Problem Relation Age of Onset    Diverticulitis Mother     Diabetes Mother     Hypertension Mother     Osteoporosis Mother     Varicose Veins Mother     Diabetes Father     Hypertension Father     Mental illness Brother     Other Paternal Grandmother         BREAST DISORDER    Diverticulitis Maternal Aunt     Osteoporosis Maternal Aunt     Diabetes Family     Hyperlipidemia Family     Hypertension Family     Mental illness Daughter      Past Surgical History:   Procedure Laterality Date    DILATION AND CURETTAGE OF UTERUS      LEG SURGERY Left     1998 BY DR Sydnie Martinez; 2005 (AROUND) BY A DR AT [facility]    TUBAL LIGATION  1997    VEIN LIGATION AND STRIPPING      + SKIN GRAFT OR VENOUS INTERRUPTION; BY DR Stevo SU        Current Outpatient Medications:     atoMOXetine (STRATTERA) 40 mg capsule, Take 1 capsule (40 mg total) by mouth daily, Disp: 90 capsule, Rfl: 1    Cholecalciferol (Vitamin D3) 48 MCG (2000 UT) CHEW, Chew, Disp: , Rfl:     Lumigan 0 01 % ophthalmic drops, , Disp: , Rfl:     Multiple Vitamin tablet, Take by mouth, Disp: , Rfl:     ondansetron (ZOFRAN-ODT) 4 mg disintegrating tablet, TAKE 1 TAB BY MOUTH EVERY 6 HOURS AS NEEDED FOR NAUSEA OR VOMITING, Disp: , Rfl:     Allergies   Allergen Reactions    Penicillins     Pollen Extract           Lab Review   No visits with results within 2 Month(s) from this visit     Latest known visit with results is:   Annual Exam on 04/01/2021   Component Date Value    Case Report 04/01/2021                      Value:Gynecologic Cytology Report                       Case: [id number]                                  Authorizing Provider:  SUSHILA Escobar Collected:           04/01/2021 9012              Ordering Location:     [facility]     Received:            04/01/2021   [facility] First Screen:          Bozena Buchanan, CT                                                    Specimen:    LIQUID-BASED PAP, SCREENING, Cervix, Endocervical                                          Primary Interpretation 04/01/2021 Negative for intraepithelial lesion or malignancy     Specimen Adequacy 04/01/2021 Satisfactory for evaluation  Endocervical/transformation zone component present   Additional Information 04/01/2021                      Value: This result contains rich text formatting which cannot be displayed here   LMP 04/01/2021 4/1/2013     HPV Other HR 04/01/2021 Negative     HPV16 04/01/2021 Negative     HPV18 04/01/2021 Negative         Imaging: No results found  Objective:     Physical Exam  Vitals and nursing note reviewed  Constitutional:       General: She is not in acute distress  Appearance: Normal appearance  She is well-developed  She is not ill-appearing, toxic-appearing or diaphoretic  HENT:      Head: Normocephalic and atraumatic  Right Ear: Tympanic membrane, ear canal and external ear normal  There is no impacted cerumen  Left Ear: Tympanic membrane, ear canal and external ear normal  There is no impacted cerumen  Eyes:      General:         Right eye: No discharge  Left eye: No discharge  Conjunctiva/sclera: Conjunctivae normal       Pupils: Pupils are equal, round, and reactive to light  Neck:      Thyroid: No thyromegaly  Cardiovascular:      Rate and Rhythm: Normal rate and regular rhythm  Heart sounds: Normal heart sounds  No murmur heard  No friction rub  Pulmonary:      Effort: Pulmonary effort is normal  No respiratory distress  Breath sounds: Normal breath sounds  No wheezing or rales  Abdominal:      General: Bowel sounds are normal       Palpations: Abdomen is soft  Tenderness: There is no abdominal tenderness     Musculoskeletal: General: No tenderness or deformity  Normal range of motion  Cervical back: Normal range of motion and neck supple  Lymphadenopathy:      Cervical: No cervical adenopathy  Skin:     General: Skin is warm and dry  Findings: No rash  Neurological:      General: No focal deficit present  Mental Status: She is alert and oriented to person, place, and time  Cranial Nerves: No cranial nerve deficit  Psychiatric:         Mood and Affect: Mood normal          Behavior: Behavior normal          Thought Content: Thought content normal          Judgment: Judgment normal            Patient Instructions   Reviewed all with patient  Blood pressure today is at goal   She is doing very well on her Strattera so continue the same  Have the labs done fasting but drink water prior to the test prior to your next appointment in 6 months  SUSHILA Coppola    Portions of the record may have been created with voice recognition software  Occasional wrong word or "sound a like" substitutions may have occurred due to the inherent limitations of voice recognition software  Read the chart carefully and recognize, using context, where substitutions have occurred

## 2021-09-07 NOTE — PATIENT INSTRUCTIONS
Reviewed all with patient  Blood pressure today is at goal   She is doing very well on her Strattera so continue the same  Have the labs done fasting but drink water prior to the test prior to your next appointment in 6 months

## 2021-10-13 ENCOUNTER — TELEPHONE (OUTPATIENT)
Dept: FAMILY MEDICINE CLINIC | Facility: CLINIC | Age: 61
End: 2021-10-13

## 2021-11-10 ENCOUNTER — TELEPHONE (OUTPATIENT)
Dept: GASTROENTEROLOGY | Facility: HOSPITAL | Age: 61
End: 2021-11-10

## 2021-11-11 ENCOUNTER — HOSPITAL ENCOUNTER (OUTPATIENT)
Dept: GASTROENTEROLOGY | Facility: HOSPITAL | Age: 61
Setting detail: OUTPATIENT SURGERY
Discharge: HOME | End: 2021-11-11
Attending: INTERNAL MEDICINE
Payer: COMMERCIAL

## 2021-11-11 ENCOUNTER — ANESTHESIA EVENT (OUTPATIENT)
Dept: GASTROENTEROLOGY | Facility: HOSPITAL | Age: 61
End: 2021-11-11

## 2021-11-11 ENCOUNTER — ANESTHESIA (OUTPATIENT)
Dept: GASTROENTEROLOGY | Facility: HOSPITAL | Age: 61
End: 2021-11-11

## 2021-11-11 VITALS
RESPIRATION RATE: 20 BRPM | TEMPERATURE: 97 F | DIASTOLIC BLOOD PRESSURE: 86 MMHG | HEART RATE: 73 BPM | OXYGEN SATURATION: 99 % | WEIGHT: 164.02 LBS | BODY MASS INDEX: 26.36 KG/M2 | SYSTOLIC BLOOD PRESSURE: 156 MMHG | HEIGHT: 66 IN

## 2021-11-11 DIAGNOSIS — K62.5 RECTAL BLEEDING: ICD-10-CM

## 2021-11-11 PROCEDURE — 45378 DIAGNOSTIC COLONOSCOPY: CPT | Performed by: INTERNAL MEDICINE

## 2021-11-11 RX ORDER — PROPOFOL 10 MG/ML
INJECTION, EMULSION INTRAVENOUS AS NEEDED
Status: DISCONTINUED | OUTPATIENT
Start: 2021-11-11 | End: 2021-11-11

## 2021-11-11 RX ORDER — B-COMPLEX WITH VITAMIN C
250 TABLET ORAL DAILY
COMMUNITY

## 2021-11-11 RX ORDER — LIDOCAINE HYDROCHLORIDE 10 MG/ML
INJECTION, SOLUTION EPIDURAL; INFILTRATION; INTRACAUDAL; PERINEURAL AS NEEDED
Status: DISCONTINUED | OUTPATIENT
Start: 2021-11-11 | End: 2021-11-11

## 2021-11-11 RX ORDER — SODIUM CHLORIDE, SODIUM LACTATE, POTASSIUM CHLORIDE, CALCIUM CHLORIDE 600; 310; 30; 20 MG/100ML; MG/100ML; MG/100ML; MG/100ML
125 INJECTION, SOLUTION INTRAVENOUS CONTINUOUS
Status: DISCONTINUED | OUTPATIENT
Start: 2021-11-11 | End: 2021-11-15 | Stop reason: HOSPADM

## 2021-11-11 RX ADMIN — SODIUM CHLORIDE, SODIUM LACTATE, POTASSIUM CHLORIDE, AND CALCIUM CHLORIDE 125 ML/HR: .6; .31; .03; .02 INJECTION, SOLUTION INTRAVENOUS at 09:47

## 2021-11-11 RX ADMIN — PROPOFOL 30 MG: 10 INJECTION, EMULSION INTRAVENOUS at 10:42

## 2021-11-11 RX ADMIN — LIDOCAINE HYDROCHLORIDE 50 MG: 10 INJECTION, SOLUTION EPIDURAL; INFILTRATION; INTRACAUDAL; PERINEURAL at 10:41

## 2021-11-11 RX ADMIN — PROPOFOL 100 MG: 10 INJECTION, EMULSION INTRAVENOUS at 10:41

## 2021-11-15 ENCOUNTER — PREP FOR PROCEDURE (OUTPATIENT)
Dept: GASTROENTEROLOGY | Facility: CLINIC | Age: 61
End: 2021-11-15

## 2021-11-15 ENCOUNTER — TELEPHONE (OUTPATIENT)
Dept: GASTROENTEROLOGY | Facility: CLINIC | Age: 61
End: 2021-11-15

## 2021-11-15 DIAGNOSIS — K62.5 RECTAL BLEEDING: Primary | ICD-10-CM

## 2022-03-14 ENCOUNTER — ANESTHESIA EVENT (OUTPATIENT)
Dept: GASTROENTEROLOGY | Facility: HOSPITAL | Age: 62
End: 2022-03-14

## 2022-03-14 ENCOUNTER — HOSPITAL ENCOUNTER (OUTPATIENT)
Dept: GASTROENTEROLOGY | Facility: HOSPITAL | Age: 62
Setting detail: OUTPATIENT SURGERY
Discharge: HOME | End: 2022-03-14
Attending: INTERNAL MEDICINE | Admitting: INTERNAL MEDICINE
Payer: COMMERCIAL

## 2022-03-14 ENCOUNTER — ANESTHESIA (OUTPATIENT)
Dept: GASTROENTEROLOGY | Facility: HOSPITAL | Age: 62
End: 2022-03-14

## 2022-03-14 VITALS
DIASTOLIC BLOOD PRESSURE: 65 MMHG | HEIGHT: 66 IN | WEIGHT: 164.02 LBS | RESPIRATION RATE: 20 BRPM | OXYGEN SATURATION: 96 % | HEART RATE: 74 BPM | BODY MASS INDEX: 26.36 KG/M2 | SYSTOLIC BLOOD PRESSURE: 136 MMHG | TEMPERATURE: 97.1 F

## 2022-03-14 DIAGNOSIS — K62.5 RECTAL BLEEDING: ICD-10-CM

## 2022-03-14 PROCEDURE — 88305 TISSUE EXAM BY PATHOLOGIST: CPT | Performed by: PATHOLOGY

## 2022-03-14 PROCEDURE — 45385 COLONOSCOPY W/LESION REMOVAL: CPT | Performed by: INTERNAL MEDICINE

## 2022-03-14 RX ORDER — LIDOCAINE HYDROCHLORIDE 10 MG/ML
INJECTION, SOLUTION EPIDURAL; INFILTRATION; INTRACAUDAL; PERINEURAL AS NEEDED
Status: DISCONTINUED | OUTPATIENT
Start: 2022-03-14 | End: 2022-03-14

## 2022-03-14 RX ORDER — SODIUM CHLORIDE, SODIUM LACTATE, POTASSIUM CHLORIDE, CALCIUM CHLORIDE 600; 310; 30; 20 MG/100ML; MG/100ML; MG/100ML; MG/100ML
INJECTION, SOLUTION INTRAVENOUS CONTINUOUS PRN
Status: DISCONTINUED | OUTPATIENT
Start: 2022-03-14 | End: 2022-03-14

## 2022-03-14 RX ORDER — SODIUM CHLORIDE, SODIUM LACTATE, POTASSIUM CHLORIDE, CALCIUM CHLORIDE 600; 310; 30; 20 MG/100ML; MG/100ML; MG/100ML; MG/100ML
125 INJECTION, SOLUTION INTRAVENOUS CONTINUOUS
Status: DISCONTINUED | OUTPATIENT
Start: 2022-03-14 | End: 2022-03-18 | Stop reason: HOSPADM

## 2022-03-14 RX ORDER — PROPOFOL 10 MG/ML
INJECTION, EMULSION INTRAVENOUS AS NEEDED
Status: DISCONTINUED | OUTPATIENT
Start: 2022-03-14 | End: 2022-03-14

## 2022-03-14 RX ADMIN — PROPOFOL 30 MG: 10 INJECTION, EMULSION INTRAVENOUS at 11:08

## 2022-03-14 RX ADMIN — PROPOFOL 100 MG: 10 INJECTION, EMULSION INTRAVENOUS at 11:03

## 2022-03-14 RX ADMIN — PROPOFOL 40 MG: 10 INJECTION, EMULSION INTRAVENOUS at 11:04

## 2022-03-14 RX ADMIN — PROPOFOL 20 MG: 10 INJECTION, EMULSION INTRAVENOUS at 11:11

## 2022-03-14 RX ADMIN — LIDOCAINE HYDROCHLORIDE 60 MG: 10 INJECTION, SOLUTION EPIDURAL; INFILTRATION; INTRACAUDAL; PERINEURAL at 11:02

## 2022-03-14 RX ADMIN — PROPOFOL 30 MG: 10 INJECTION, EMULSION INTRAVENOUS at 11:06

## 2022-03-14 RX ADMIN — SODIUM CHLORIDE, SODIUM LACTATE, POTASSIUM CHLORIDE, AND CALCIUM CHLORIDE 125 ML/HR: .6; .31; .03; .02 INJECTION, SOLUTION INTRAVENOUS at 10:34

## 2022-03-14 RX ADMIN — SODIUM CHLORIDE, SODIUM LACTATE, POTASSIUM CHLORIDE, AND CALCIUM CHLORIDE: .6; .31; .03; .02 INJECTION, SOLUTION INTRAVENOUS at 10:57

## 2022-03-14 NOTE — ANESTHESIA POSTPROCEDURE EVALUATION
Post-Op Assessment Note    CV Status:  Stable    Pain management: adequate     Mental Status:  Sleepy   Hydration Status:  Euvolemic   PONV Controlled:  Controlled   Airway Patency:  Patent      Post Op Vitals Reviewed: Yes      Staff: CRNA         No complications documented      BP   135/69   Temp      Pulse  71   Resp   16   SpO2   100

## 2022-03-14 NOTE — INTERVAL H&P NOTE
H&P reviewed  After examining the patient I find no changes in the patients condition since the H&P had been written      Vitals:    03/14/22 1025   BP: 143/71   Pulse: 71   Resp: 18   SpO2: 100%

## 2022-03-14 NOTE — H&P
History and Physical -  [facility]  Cipriano Leo 64 y o  female MRN: [id number]                  HPI: Cipriano Leo is a 64y o  year old female who presents for colonoscopy for evaluation of rectal bleeding a colon cancer screening      REVIEW OF SYSTEMS: Per the HPI, and otherwise unremarkable  Historical Information   Past Medical History:   Diagnosis Date    Basal cell carcinoma (BCC) of back      Past Surgical History:   Procedure Laterality Date    DILATION AND CURETTAGE OF UTERUS      LEG SURGERY Left     1998 BY DR Kina Haskins; 2005 (AROUND) BY A DR AT [address] [place] [address]    VEIN LIGATION AND STRIPPING      + SKIN GRAFT OR VENOUS INTERRUPTION; BY DR Ania SU      Social History   Social History     Substance and Sexual Activity   Alcohol Use Yes    Comment: CONSUMES ALCOHOL AT SOCIAL EVENTS; SOCIAL USE      Social History     Substance and Sexual Activity   Drug Use Yes    Types: Marijuana    Comment: medical      Social History     Tobacco Use   Smoking Status Former Smoker   Smokeless Tobacco Never Used     Family History   Problem Relation Age of Onset    Diverticulitis Mother     Diabetes Mother     Hypertension Mother     Osteoporosis Mother     Varicose Veins Mother     Diabetes Father     Hypertension Father     Mental illness Brother     Other Paternal Grandmother         BREAST DISORDER    Diverticulitis Maternal Aunt     Osteoporosis Maternal Aunt     Diabetes Family     Hyperlipidemia Family     Hypertension Family     Mental illness Daughter        Meds/Allergies     (Not in a hospital admission)      Allergies   Allergen Reactions    Penicillins     Pollen Extract        Objective     There were no vitals taken for this visit  PHYSICAL EXAM    There were no vitals taken for this visit        Gen: NAD  CV: RRR  CHEST: Clear  ABD: soft, NT/ND  EXT: no edema      ASSESSMENT/PLAN:  This is a 64y o  year old female here for colonoscopy, and she is stable and optimized for her procedure

## 2022-03-14 NOTE — ANESTHESIA PREPROCEDURE EVALUATION
Procedure:  COLONOSCOPY    Relevant Problems   CARDIO   (+) Mixed hyperlipidemia      NEURO/PSYCH   (+) Anxiety        Physical Exam    Airway    Mallampati score: II  TM Distance: >3 FB  Neck ROM: full     Dental   upper dentures and lower dentures,     Cardiovascular  Cardiovascular exam normal    Pulmonary  Pulmonary exam normal     Other Findings        Anesthesia Plan  ASA Score- 2     Anesthesia Type- IV sedation with anesthesia with ASA Monitors  Additional Monitors:   Airway Plan:           Plan Factors-Exercise tolerance (METS): >4 METS  Chart reviewed  Existing labs reviewed  Patient summary reviewed  Patient is not a current smoker  Induction- intravenous  Postoperative Plan-     Informed Consent- Anesthetic plan and risks discussed with patient  I personally reviewed this patient with the CRNA  Discussed and agreed on the Anesthesia Plan with the CRNA  Pinky Zhou

## 2022-03-27 LAB
ALBUMIN SERPL-MCNC: 4.6 G/DL (ref 3.8–4.8)
ALBUMIN/CREAT UR: 10 MG/G CREAT (ref 0–29)
ALBUMIN/GLOB SERPL: 1.8 {RATIO} (ref 1.2–2.2)
ALP SERPL-CCNC: 75 IU/L (ref 44–121)
ALT SERPL-CCNC: 26 IU/L (ref 0–32)
APPEARANCE UR: CLEAR
AST SERPL-CCNC: 21 IU/L (ref 0–40)
BACTERIA URNS QL MICRO: ABNORMAL
BILIRUB SERPL-MCNC: 0.2 MG/DL (ref 0–1.2)
BILIRUB UR QL STRIP: NEGATIVE
BUN SERPL-MCNC: 14 MG/DL (ref 8–27)
BUN/CREAT SERPL: 16 (ref 12–28)
CALCIUM SERPL-MCNC: 10.3 MG/DL (ref 8.7–10.3)
CASTS URNS QL MICRO: ABNORMAL /LPF
CHLORIDE SERPL-SCNC: 102 MMOL/L (ref 96–106)
CHOLEST SERPL-MCNC: 280 MG/DL (ref 100–199)
CO2 SERPL-SCNC: 23 MMOL/L (ref 20–29)
COLOR UR: YELLOW
CREAT SERPL-MCNC: 0.86 MG/DL (ref 0.57–1)
CREAT UR-MCNC: 124 MG/DL
EGFR: 77 ML/MIN/1.73
EPI CELLS #/AREA URNS HPF: ABNORMAL /HPF (ref 0–10)
GLOBULIN SER-MCNC: 2.5 G/DL (ref 1.5–4.5)
GLUCOSE SERPL-MCNC: 94 MG/DL (ref 65–99)
GLUCOSE UR QL: NEGATIVE
HDLC SERPL-MCNC: 59 MG/DL
HGB UR QL STRIP: NEGATIVE
KETONES UR QL STRIP: NEGATIVE
LDLC SERPL CALC-MCNC: 189 MG/DL (ref 0–99)
LEUKOCYTE ESTERASE UR QL STRIP: ABNORMAL
MICRO URNS: ABNORMAL
MICROALBUMIN UR-MCNC: 11.8 UG/ML
NITRITE UR QL STRIP: NEGATIVE
PH UR STRIP: 6 [PH] (ref 5–7.5)
POTASSIUM SERPL-SCNC: 4 MMOL/L (ref 3.5–5.2)
PROT SERPL-MCNC: 7.1 G/DL (ref 6–8.5)
PROT UR QL STRIP: NEGATIVE
RBC #/AREA URNS HPF: ABNORMAL /HPF (ref 0–2)
SL AMB VLDL CHOLESTEROL CALC: 32 MG/DL (ref 5–40)
SODIUM SERPL-SCNC: 142 MMOL/L (ref 134–144)
SP GR UR: 1.02 (ref 1–1.03)
TRIGL SERPL-MCNC: 173 MG/DL (ref 0–149)
UROBILINOGEN UR STRIP-ACNC: 0.2 MG/DL (ref 0.2–1)
WBC #/AREA URNS HPF: ABNORMAL /HPF (ref 0–5)

## 2022-03-28 ENCOUNTER — TELEPHONE (OUTPATIENT)
Dept: FAMILY MEDICINE CLINIC | Facility: CLINIC | Age: 62
End: 2022-03-28

## 2022-03-29 ENCOUNTER — OFFICE VISIT (OUTPATIENT)
Dept: FAMILY MEDICINE CLINIC | Facility: CLINIC | Age: 62
End: 2022-03-29
Payer: COMMERCIAL

## 2022-03-29 VITALS
HEIGHT: 66 IN | OXYGEN SATURATION: 93 % | BODY MASS INDEX: 27 KG/M2 | WEIGHT: 168 LBS | DIASTOLIC BLOOD PRESSURE: 78 MMHG | SYSTOLIC BLOOD PRESSURE: 122 MMHG | HEART RATE: 70 BPM

## 2022-03-29 DIAGNOSIS — Z00.01 ENCOUNTER FOR WELL ADULT EXAM WITH ABNORMAL FINDINGS: ICD-10-CM

## 2022-03-29 DIAGNOSIS — E78.5 HYPERLIPIDEMIA, UNSPECIFIED HYPERLIPIDEMIA TYPE: ICD-10-CM

## 2022-03-29 DIAGNOSIS — N30.00 ACUTE CYSTITIS WITHOUT HEMATURIA: Primary | ICD-10-CM

## 2022-03-29 PROCEDURE — 1036F TOBACCO NON-USER: CPT | Performed by: NURSE PRACTITIONER

## 2022-03-29 PROCEDURE — 3725F SCREEN DEPRESSION PERFORMED: CPT | Performed by: NURSE PRACTITIONER

## 2022-03-29 PROCEDURE — 3008F BODY MASS INDEX DOCD: CPT | Performed by: NURSE PRACTITIONER

## 2022-03-29 PROCEDURE — 99396 PREV VISIT EST AGE 40-64: CPT | Performed by: NURSE PRACTITIONER

## 2022-03-29 RX ORDER — IBUPROFEN 600 MG/1
600 TABLET ORAL EVERY 6 HOURS PRN
COMMUNITY
Start: 2021-12-22 | End: 2022-06-03 | Stop reason: ALTCHOICE

## 2022-03-29 RX ORDER — CIPROFLOXACIN 500 MG/1
500 TABLET, FILM COATED ORAL EVERY 12 HOURS SCHEDULED
Qty: 10 TABLET | Refills: 0 | Status: SHIPPED | OUTPATIENT
Start: 2022-03-29 | End: 2022-04-03

## 2022-03-29 RX ORDER — CLINDAMYCIN HYDROCHLORIDE 300 MG/1
CAPSULE ORAL
COMMUNITY
Start: 2021-12-22 | End: 2022-06-03 | Stop reason: ALTCHOICE

## 2022-03-29 RX ORDER — DEXAMETHASONE 4 MG/1
TABLET ORAL
COMMUNITY
Start: 2021-12-22 | End: 2022-06-03 | Stop reason: ALTCHOICE

## 2022-03-29 NOTE — PROGRESS NOTES
[address] [place] [address]    NAME: Barbara Wallis  AGE: 64 y o  SEX: female  : 1960     DATE: 3/30/2022     Assessment and Plan:     Problem List Items Addressed This Visit        Other    Encounter for well adult exam with abnormal findings       Annual physical completed  Patient is also here with complaints of urinary symptoms  UA positive for leukocytes  Urine was done more than a week ago  Cipro therapy  Advised to take probiotic with medication  Patient is current with mammogram   Does not get Pap smears  Reviewed blood work  Patient declines statin therapy for elevated cholesterol at this time  Discussed cardiac risk  Patient states that she will be diligent with diet  Increase fiber intake  Increase exercise activity  Decrease fatty food intake  Will recheck lipid panel in   Three months  If cholesterol level continues to be elevated  Will start statin therapy           Other Visit Diagnoses     Acute cystitis without hematuria    -  Primary    Relevant Medications    ciprofloxacin (CIPRO) 500 mg tablet    Hyperlipidemia, unspecified hyperlipidemia type        Relevant Orders    Lipid panel          Immunizations and preventive care screenings were discussed with patient today  Appropriate education was printed on patient's after visit summary  Counseling:  Alcohol/drug use: discussed moderation in alcohol intake, the recommendations for healthy alcohol use, and avoidance of illicit drug use  Dental Health: discussed importance of regular tooth brushing, flossing, and dental visits  Injury prevention: discussed safety/seat belts, safety helmets, smoke detectors, carbon dioxide detectors, and smoking near bedding or upholstery    Sexual health: discussed sexually transmitted diseases, partner selection, use of condoms, avoidance of unintended pregnancy, and contraceptive alternatives  · Exercise: the importance of regular exercise/physical activity was discussed  Recommend exercise 3-5 times per week for at least 30 minutes  BMI Counseling: Body mass index is 27 12 kg/m²  The BMI is above normal  Nutrition recommendations include decreasing portion sizes, encouraging healthy choices of fruits and vegetables, decreasing fast food intake, consuming healthier snacks, limiting drinks that contain sugar, moderation in carbohydrate intake, increasing intake of lean protein, reducing intake of saturated and trans fat and reducing intake of cholesterol  Exercise recommendations include exercising 3-5 times per week and strength training exercises  No pharmacotherapy was ordered  Rationale for BMI follow-up plan is due to patient being overweight or obese  Depression Screening and Follow-up Plan: Patient was screened for depression during today's encounter  They screened negative with a PHQ-2 score of 0  Return if symptoms worsen or fail to improve  Chief Complaint:     Chief Complaint   Patient presents with    Urinary Tract Infection      History of Present Illness:     Adult Annual Physical   Patient here for a comprehensive physical exam  The patient reports problems - urinary symptoms  Diet and Physical Activity  · Diet/Nutrition: poor diet  · Exercise: walking and 3-4 times a week on average  Depression Screening  PHQ-2/9 Depression Screening    Little interest or pleasure in doing things: 0 - not at all  Feeling down, depressed, or hopeless: 0 - not at all  PHQ-2 Score: 0  PHQ-2 Interpretation: Negative depression screen       General Health  · Sleep: sleeps well  · Hearing: normal - bilateral   · Vision: most recent eye exam >1 year ago and wears glasses  · Dental: regular dental visits and brushes teeth twice daily  /GYN Health  · Patient is: postmenopausal  · Last menstrual period: 2013    · Contraceptive method: none       Review of Systems:     Review of Systems   Past Medical History:     Past Medical History:   Diagnosis Date    Basal cell carcinoma (BCC) of back       Past Surgical History:     Past Surgical History:   Procedure Laterality Date    DILATION AND CURETTAGE OF UTERUS      LEG SURGERY Left     1998 BY DR Alferd Sandifer; 2005 (AROUND) BY A DR AT [facility]    TUBAL LIGATION  1997    VEIN LIGATION AND STRIPPING      + SKIN GRAFT OR VENOUS INTERRUPTION; BY DR Jany SU       Social History:     Social History     Socioeconomic History    Marital status: /Civil Union     Spouse name: None    Number of children: 3    Years of education: None    Highest education level: None   Occupational History    None   Tobacco Use    Smoking status: Former Smoker    Smokeless tobacco: Never Used   Vaping Use    Vaping Use: Former   Substance and Sexual Activity    Alcohol use: Yes     Comment: CONSUMES ALCOHOL AT SOCIAL EVENTS; SOCIAL USE     Drug use: Yes     Types: Marijuana     Comment: medical     Sexual activity: None   Other Topics Concern    None   Social History Narrative    Always uses seat belt    Daily caffeine consumption    Full-time employment     Social Determinants of Health     Financial Resource Strain: Not on file   Food Insecurity: Not on file   Transportation Needs: Not on file   Physical Activity: Not on file   Stress: Not on file   Social Connections: Not on file   Intimate Partner Violence: Not on file   Housing Stability: Not on file      Family History:     Family History   Problem Relation Age of Onset    Diverticulitis Mother     Diabetes Mother     Hypertension Mother     Osteoporosis Mother     Varicose Veins Mother     Diabetes Father     Hypertension Father     Mental illness Brother     Other Paternal Grandmother         BREAST DISORDER    Diverticulitis Maternal Aunt     Osteoporosis Maternal Aunt     Diabetes Family     Hyperlipidemia Family     Hypertension Family     Mental illness Daughter       Current Medications:     Current Outpatient Medications   Medication Sig Dispense Refill    atoMOXetine (STRATTERA) 40 mg capsule Take 1 capsule (40 mg total) by mouth daily 90 capsule 1    Cholecalciferol (Vitamin D3) 50 MCG (2000 UT) CHEW Chew      ciprofloxacin (CIPRO) 500 mg tablet Take 1 tablet (500 mg total) by mouth every 12 (twelve) hours for 5 days 10 tablet 0    clindamycin (CLEOCIN) 300 MG capsule TAKE TWO CAPS THE EVENING BEFORE PROCEDURE AND CONTINUE TAKING ONE CAP EVERY 6 HOURS UNTIL GONE   dexamethasone (DECADRON) 4 mg tablet TAKE ONE TABLET ONE HOUR PRIOR TO APPOINTMENT AND TAKE ONE 12 HOURS LATER      ibuprofen (MOTRIN) 600 mg tablet Take 600 mg by mouth every 6 (six) hours as needed      Lumigan 0 01 % ophthalmic drops       Multiple Vitamin tablet Take by mouth      ondansetron (ZOFRAN-ODT) 4 mg disintegrating tablet TAKE 1 TAB BY MOUTH EVERY 6 HOURS AS NEEDED FOR NAUSEA OR VOMITING      Thiamine HCl (vitamin B-1) 250 MG tablet Take 250 mg by mouth daily       No current facility-administered medications for this visit  Allergies: Allergies   Allergen Reactions    Penicillins     Pollen Extract       Physical Exam:     /78   Pulse 70   Ht 5' 6" (1 676 m)   Wt 76 2 kg (168 lb)   SpO2 93%   BMI 27 12 kg/m²     Physical Exam  Constitutional:       General: She is not in acute distress  Appearance: Normal appearance  She is obese  She is not ill-appearing  HENT:      Head: Normocephalic and atraumatic  Nose: Nose normal       Mouth/Throat:      Mouth: Mucous membranes are moist    Eyes:      Pupils: Pupils are equal, round, and reactive to light  Cardiovascular:      Rate and Rhythm: Normal rate and regular rhythm  Pulses: Normal pulses  Pulmonary:      Effort: Pulmonary effort is normal  No respiratory distress  Breath sounds: Normal breath sounds  Chest:      Chest wall: No tenderness     Abdominal: General: Abdomen is flat  Bowel sounds are normal  There is no distension  Palpations: There is no mass  Tenderness: There is abdominal tenderness (suprapubic)  There is no right CVA tenderness or left CVA tenderness  Musculoskeletal:         General: Normal range of motion  Cervical back: Normal range of motion and neck supple  Skin:     General: Skin is warm and dry  Neurological:      General: No focal deficit present  Mental Status: She is alert and oriented to person, place, and time  Psychiatric:         Mood and Affect: Mood normal          Behavior: Behavior normal          Thought Content:  Thought content normal          Judgment: Judgment normal           Belen Snow, [address] [facility]

## 2022-03-29 NOTE — PATIENT INSTRUCTIONS
Take cipro twice a day for 5 days  Increase fluid hydration  Use probiotic or eat yogurt while taking antibiotics to prevent abdominal discomfort    Dysuria   WHAT YOU NEED TO KNOW:   Dysuria is difficulty urinating, or pain, burning, or discomfort with urination  Dysuria is usually a symptom of another problem  DISCHARGE INSTRUCTIONS:   Return to the emergency department if:   · You have severe back, side, or abdominal pain  · You have fever and shaking chills  · You vomit several times in a row  Contact your healthcare provider if:   · Your symptoms do not go away, even after treatment  · You have questions or concerns about your condition or care  Medicines:   · Medicines  may be given to help treat a bacterial infection or help decrease bladder spasms  · Take your medicine as directed  Contact your healthcare provider if you think your medicine is not helping or if you have side effects  Tell him of her if you are allergic to any medicine  Keep a list of the medicines, vitamins, and herbs you take  Include the amounts, and when and why you take them  Bring the list or the pill bottles to follow-up visits  Carry your medicine list with you in case of an emergency  Follow up with your healthcare provider as directed: Your healthcare provider may also refer you to a urologist or nephrologist to have additional testing  Write down your questions so you remember to ask them during your visits  Manage your dysuria:   · Drink more liquids  Liquids help flush out bacteria that may be causing an infection  Ask your healthcare provider how much liquid to drink each day and which liquids are best for you  · Take sitz baths as directed  Fill a bathtub with 4 to 6 inches of warm water  You may also use a sitz bath pan that fits over a toilet  Sit in the sitz bath for 20 minutes  Do this 2 to 3 times a day, or as directed  The warm water can help decrease pain and swelling       © Copyright [employer] 2022 Information is for [employer] use only and may not be sold, redistributed or otherwise used for commercial purposes  All illustrations and images included in CareNotes® are the copyrighted property of [employer]  or Kim Morgan  The above information is an  only  It is not intended as medical advice for individual conditions or treatments  Talk to your doctor, nurse or pharmacist before following any medical regimen to see if it is safe and effective for you  Wellness Visit for Adults   AMBULATORY CARE:   A wellness visit  is when you see your healthcare provider to get screened for health problems  Your healthcare provider will also give you advice on how to stay healthy  Write down your questions so you remember to ask them  Ask your healthcare provider how often you should have a wellness visit  What happens at a wellness visit:  Your healthcare provider will ask about your health, and your family history of health problems  This includes high blood pressure, heart disease, and cancer  He or she will ask if you have symptoms that concern you, if you smoke, and about your mood  You may also be asked about your intake of medicines, supplements, food, and alcohol  Any of the following may be done:  · Your weight  will be checked  Your height may also be checked so your body mass index (BMI) can be calculated  Your BMI shows if you are at a healthy weight  · Your blood pressure  and heart rate will be checked  Your temperature may also be checked  · Blood and urine tests  may be done  Blood tests may be done to check your cholesterol levels  Abnormal cholesterol levels increase your risk for heart disease and stroke  You may also need a blood or urine test to check for diabetes if you are at increased risk  Urine tests may be done to look for signs of an infection or kidney disease      · A physical exam  includes checking your heartbeat and lungs with a stethoscope  Your healthcare provider may also check your skin to look for sun damage  · Screening tests  may be recommended  A screening test is done to check for diseases that may not cause symptoms  The screening tests you may need depend on your age, gender, family history, and lifestyle habits  For example, colorectal screening may be recommended if you are 48years old or older  Screening tests you need if you are a woman:   · A Pap smear  is used to screen for cervical cancer  Pap smears are usually done every 3 to 5 years depending on your age  You may need them more often if you have had abnormal Pap smear test results in the past  Ask your healthcare provider how often you should have a Pap smear  · A mammogram  is an x-ray of your breasts to screen for breast cancer  Experts recommend mammograms every 2 years starting at age 48 years  You may need a mammogram at age 52 years or younger if you have an increased risk for breast cancer  Talk to your healthcare provider about when you should start having mammograms and how often you need them  Vaccines you may need:   · Get an influenza vaccine  every year  The influenza vaccine protects you from the flu  Several types of viruses cause the flu  The viruses change over time, so new vaccines are made each year  · Get a tetanus-diphtheria (Td) booster vaccine  every 10 years  This vaccine protects you against tetanus and diphtheria  Tetanus is a severe infection that may cause painful muscle spasms and lockjaw  Diphtheria is a severe bacterial infection that causes a thick covering in the back of your mouth and throat  · Get a human papillomavirus (HPV) vaccine  if you are female and aged 23 to 32 or male 23 to 24 and never received it  This vaccine protects you from HPV infection  HPV is the most common infection spread by sexual contact  HPV may also cause vaginal, penile, and anal cancers      · Get a pneumococcal vaccine  if you are aged 72 years or older  The pneumococcal vaccine is an injection given to protect you from pneumococcal disease  Pneumococcal disease is an infection caused by pneumococcal bacteria  The infection may cause pneumonia, meningitis, or an ear infection  · Get a shingles vaccine  if you are 60 or older, even if you have had shingles before  The shingles vaccine is an injection to protect you from the varicella-zoster virus  This is the same virus that causes chickenpox  Shingles is a painful rash that develops in people who had chickenpox or have been exposed to the virus  How to eat healthy:  My Plate is a model for planning healthy meals  It shows the types and amounts of foods that should go on your plate  Fruits and vegetables make up about half of your plate, and grains and protein make up the other half  A serving of dairy is included on the side of your plate  The amount of calories and serving sizes you need depends on your age, gender, weight, and height  Examples of healthy foods are listed below:  · Eat a variety of vegetables  such as dark green, red, and orange vegetables  You can also include canned vegetables low in sodium (salt) and frozen vegetables without added butter or sauces  · Eat a variety of fresh fruits , canned fruit in 100% juice, frozen fruit, and dried fruit  · Include whole grains  At least half of the grains you eat should be whole grains  Examples include whole-wheat bread, wheat pasta, brown rice, and whole-grain cereals such as oatmeal     · Eat a variety of protein foods such as seafood (fish and shellfish), lean meat, and poultry without skin (turkey and chicken)  Examples of lean meats include pork leg, shoulder, or tenderloin, and beef round, sirloin, tenderloin, and extra lean ground beef  Other protein foods include eggs and egg substitutes, beans, peas, soy products, nuts, and seeds      · Choose low-fat dairy products such as skim or 1% milk or low-fat yogurt, cheese, and cottage cheese  · Limit unhealthy fats  such as butter, hard margarine, and shortening  Exercise:  Exercise at least 30 minutes per day on most days of the week  Some examples of exercise include walking, biking, dancing, and swimming  You can also fit in more physical activity by taking the stairs instead of the elevator or parking farther away from stores  Include muscle strengthening activities 2 days each week  Regular exercise provides many health benefits  It helps you manage your weight, and decreases your risk for type 2 diabetes, heart disease, stroke, and high blood pressure  Exercise can also help improve your mood  Ask your healthcare provider about the best exercise plan for you  General health and safety guidelines:   · Do not smoke  Nicotine and other chemicals in cigarettes and cigars can cause lung damage  Ask your healthcare provider for information if you currently smoke and need help to quit  E-cigarettes or smokeless tobacco still contain nicotine  Talk to your healthcare provider before you use these products  · Limit alcohol  A drink of alcohol is 12 ounces of beer, 5 ounces of wine, or 1½ ounces of liquor  · Lose weight, if needed  Being overweight increases your risk of certain health conditions  These include heart disease, high blood pressure, type 2 diabetes, and certain types of cancer  · Protect your skin  Do not sunbathe or use tanning beds  Use sunscreen with a SPF 15 or higher  Apply sunscreen at least 15 minutes before you go outside  Reapply sunscreen every 2 hours  Wear protective clothing, hats, and sunglasses when you are outside  · Drive safely  Always wear your seatbelt  Make sure everyone in your car wears a seatbelt  A seatbelt can save your life if you are in an accident  Do not use your cell phone when you are driving  This could distract you and cause an accident  Pull over if you need to make a call or send a text message      · Practice safe sex  Use latex condoms if are sexually active and have more than one partner  Your healthcare provider may recommend screening tests for sexually transmitted infections (STIs)  · Wear helmets, lifejackets, and protective gear  Always wear a helmet when you ride a bike or motorcycle, go skiing, or play sports that could cause a head injury  Wear protective equipment when you play sports  Wear a lifejacket when you are on a boat or doing water sports  © Copyright [employer] 2022 Information is for End User's use only and may not be sold, redistributed or otherwise used for commercial purposes  All illustrations and images included in CareNotes® are the copyrighted property of A D A M , Inc  or [facility] [place] Harriett Sutton   The above information is an  only  It is not intended as medical advice for individual conditions or treatments  Talk to your doctor, nurse or pharmacist before following any medical regimen to see if it is safe and effective for you

## 2022-03-30 PROBLEM — Z00.01 ENCOUNTER FOR WELL ADULT EXAM WITH ABNORMAL FINDINGS: Status: ACTIVE | Noted: 2022-03-30

## 2022-03-30 NOTE — ASSESSMENT & PLAN NOTE
Annual physical completed  Patient is also here with complaints of urinary symptoms  UA positive for leukocytes  Urine was done more than a week ago  Cipro therapy  Advised to take probiotic with medication  Patient is current with mammogram   Does not get Pap smears  Reviewed blood work  Patient declines statin therapy for elevated cholesterol at this time  Discussed cardiac risk  Patient states that she will be diligent with diet  Increase fiber intake  Increase exercise activity  Decrease fatty food intake  Will recheck lipid panel in   Three months    If cholesterol level continues to be elevated  Will start statin therapy

## 2022-05-18 ENCOUNTER — TELEMEDICINE (OUTPATIENT)
Dept: FAMILY MEDICINE CLINIC | Facility: CLINIC | Age: 62
End: 2022-05-18
Payer: COMMERCIAL

## 2022-05-18 DIAGNOSIS — J06.9 URI, ACUTE: Primary | ICD-10-CM

## 2022-05-18 DIAGNOSIS — R68.89 FLU-LIKE SYMPTOMS: ICD-10-CM

## 2022-05-18 PROCEDURE — 99213 OFFICE O/P EST LOW 20 MIN: CPT | Performed by: NURSE PRACTITIONER

## 2022-05-18 PROCEDURE — 87636 SARSCOV2 & INF A&B AMP PRB: CPT | Performed by: NURSE PRACTITIONER

## 2022-05-18 PROCEDURE — 1036F TOBACCO NON-USER: CPT | Performed by: NURSE PRACTITIONER

## 2022-05-18 RX ORDER — AZITHROMYCIN 250 MG/1
TABLET, FILM COATED ORAL
Qty: 6 TABLET | Refills: 0 | Status: SHIPPED | OUTPATIENT
Start: 2022-05-18 | End: 2022-05-23

## 2022-05-18 NOTE — ASSESSMENT & PLAN NOTE
Patient has some flu-like symptoms, congestion, sore throat, fatigue  COVID and flu a testing ordered  Discussed increasing fluid hydration managing symptoms

## 2022-05-18 NOTE — ASSESSMENT & PLAN NOTE
Acute upper respiratory symptoms ongoing for more than a week  Has been over-the-counter medications little help  Zithromax therapy    Steam to help decongest increase fluid hydration, hot tea with honey

## 2022-05-18 NOTE — PROGRESS NOTES
Virtual Regular Visit    Verification of patient location:    Patient is located in the following state in which I hold an active license [place]      Assessment/Plan:    Problem List Items Addressed This Visit        Respiratory    URI, acute - Primary       Acute upper respiratory symptoms ongoing for more than a week  Has been over-the-counter medications little help  Zithromax therapy  Steam to help decongest increase fluid hydration, hot tea with honey           Relevant Medications    azithromycin (Zithromax) 250 mg tablet       Other    Flu-like symptoms      Patient has some flu-like symptoms, congestion, sore throat, fatigue  COVID and flu a testing ordered  Discussed increasing fluid hydration managing symptoms  Relevant Orders    Covid/Flu- Office Collect               Reason for visit is   Chief Complaint   Patient presents with    Virtual Regular Visit        Encounter provider SUSHILA Mckeon    Provider located at [address] [place] [address] [address] [facility]  [address] [place] [address] [address] [place] [address]      Recent Visits  No visits were found meeting these conditions  Showing recent visits within past 7 days and meeting all other requirements  Today's Visits  Date Type Provider Dept   05/18/22 Telemedicine Belen Hewitt [address] today's visits and meeting all other requirements  Future Appointments  No visits were found meeting these conditions  Showing future appointments within next 150 days and meeting all other requirements       The patient was identified by name and date of birth  Willem Hernandez was informed that this is a telemedicine visit and that the visit is being conducted through [address] [place] [address] [facility] and patient was informed that this is a secure, HIPAA-compliant platform  She agrees to proceed     My office door was closed  No one else was in the room    She acknowledged consent and understanding of privacy and security of the video platform  The patient has agreed to participate and understands they can discontinue the visit at any time  Patient is aware this is a billable service  Subjective  Yrn Chino is a 64 y o  female Has a comp[lainmts of sinus pain, predssure   Patient is being seen for a virtual visit with complaints of sinus pain, pressure, congestion ongoing for about a week  Has been using Sudafed and other over-the-counter medication with little relief  Patient is concerned about viral infections, has to baby-sit her grandson on Monday  Tested negative for COVID at home  Would like testing done       Past Medical History:   Diagnosis Date    Basal cell carcinoma (BCC) of back        Past Surgical History:   Procedure Laterality Date    DILATION AND CURETTAGE OF UTERUS      LEG SURGERY Left     1998 BY DR Gordon Masters; 2005 (AROUND) BY A DR AT [address] [place] [address]    VEIN LIGATION AND STRIPPING      + SKIN GRAFT OR VENOUS INTERRUPTION; BY DR Jelena SU        Current Outpatient Medications   Medication Sig Dispense Refill    azithromycin (Zithromax) 250 mg tablet Take 2 tablets (500 mg total) by mouth daily for 1 day, THEN 1 tablet (250 mg total) daily for 4 days  6 tablet 0    atoMOXetine (STRATTERA) 40 mg capsule Take 1 capsule (40 mg total) by mouth daily 90 capsule 1    Cholecalciferol (Vitamin D3) 50 MCG (2000 UT) CHEW Chew      clindamycin (CLEOCIN) 300 MG capsule TAKE TWO CAPS THE EVENING BEFORE PROCEDURE AND CONTINUE TAKING ONE CAP EVERY 6 HOURS UNTIL GONE        dexamethasone (DECADRON) 4 mg tablet TAKE ONE TABLET ONE HOUR PRIOR TO APPOINTMENT AND TAKE ONE 12 HOURS LATER      ibuprofen (MOTRIN) 600 mg tablet Take 600 mg by mouth every 6 (six) hours as needed      Lumigan 0 01 % ophthalmic drops       Multiple Vitamin tablet Take by mouth      ondansetron (ZOFRAN-ODT) 4 mg disintegrating tablet TAKE 1 TAB BY MOUTH EVERY 6 HOURS AS NEEDED FOR NAUSEA OR VOMITING      Thiamine HCl (vitamin B-1) 250 MG tablet Take 250 mg by mouth daily       No current facility-administered medications for this visit  Allergies   Allergen Reactions    Penicillins     Pollen Extract        Review of Systems   Constitutional: Positive for fatigue  HENT: Positive for congestion, sinus pressure and sinus pain  Video Exam    There were no vitals filed for this visit  Physical Exam  Constitutional:       Appearance: She is well-developed  HENT:      Head: Normocephalic and atraumatic  Nose: Congestion present  Pulmonary:      Effort: Pulmonary effort is normal    Musculoskeletal:         General: Normal range of motion  Cervical back: Normal range of motion and neck supple  Skin:     General: Skin is dry  Neurological:      Mental Status: She is alert and oriented to person, place, and time  Psychiatric:         Behavior: Behavior normal          Thought Content: Thought content normal          Judgment: Judgment normal           I spent 15 minutes directly with the patient during this visit    VIRTUAL VISIT [address] verbally agrees to participate in [employer]  Pt is aware that [employer] could be limited without vital signs or the ability to perform a full hands-on physical exam  Letty Rock understands she or the provider may request at any time to terminate the video visit and request the patient to seek care or treatment in person

## 2022-05-19 LAB
FLUAV RNA RESP QL NAA+PROBE: NEGATIVE
FLUBV RNA RESP QL NAA+PROBE: NEGATIVE
SARS-COV-2 RNA RESP QL NAA+PROBE: NEGATIVE

## 2022-06-03 ENCOUNTER — OFFICE VISIT (OUTPATIENT)
Dept: FAMILY MEDICINE CLINIC | Facility: CLINIC | Age: 62
End: 2022-06-03
Payer: COMMERCIAL

## 2022-06-03 VITALS
SYSTOLIC BLOOD PRESSURE: 138 MMHG | BODY MASS INDEX: 26.4 KG/M2 | DIASTOLIC BLOOD PRESSURE: 88 MMHG | HEIGHT: 67 IN | WEIGHT: 168.2 LBS | TEMPERATURE: 97.4 F

## 2022-06-03 DIAGNOSIS — F90.2 ATTENTION DEFICIT HYPERACTIVITY DISORDER (ADHD), COMBINED TYPE: Primary | ICD-10-CM

## 2022-06-03 DIAGNOSIS — Z12.31 ENCOUNTER FOR SCREENING MAMMOGRAM FOR MALIGNANT NEOPLASM OF BREAST: ICD-10-CM

## 2022-06-03 PROCEDURE — 3008F BODY MASS INDEX DOCD: CPT | Performed by: NURSE PRACTITIONER

## 2022-06-03 PROCEDURE — 1036F TOBACCO NON-USER: CPT | Performed by: NURSE PRACTITIONER

## 2022-06-03 PROCEDURE — 99213 OFFICE O/P EST LOW 20 MIN: CPT | Performed by: NURSE PRACTITIONER

## 2022-06-03 RX ORDER — DEXTROAMPHETAMINE SACCHARATE, AMPHETAMINE ASPARTATE, DEXTROAMPHETAMINE SULFATE AND AMPHETAMINE SULFATE 2.5; 2.5; 2.5; 2.5 MG/1; MG/1; MG/1; MG/1
10 TABLET ORAL 2 TIMES DAILY
Qty: 60 TABLET | Refills: 0 | Status: SHIPPED | OUTPATIENT
Start: 2022-06-03

## 2022-06-03 NOTE — PATIENT INSTRUCTIONS
Take adderal daily  Stop strattera  ADHD in Adults   WHAT YOU NEED TO KNOW:   ADHD is a condition that affects behavior  You may be overactive and have a short attention span  ADHD interferes with how you function in your daily activities at work, school, or home  ADHD may also cause you to have problems getting along with other people  DISCHARGE INSTRUCTIONS:   Call your local emergency number (911 in the [address]) if:   You feel like hurting yourself or someone else  Return to the emergency department if:   You have a seizure  You have trouble breathing, chest pains, or a fast heartbeat  Call your doctor if:   You feel you cannot cope at home, work, or school  You have new symptoms since the last time you visited your healthcare provider  Your symptoms are getting worse  You have questions or concerns about your condition or care  Medicines: You may need any of the following:  Stimulants  help you pay attention, concentrate better, and manage your energy  Antidepressants  help decrease or prevent the symptoms of anxiety or depression  It can also be used to treat other behavior problems  Take your medicine as directed  Contact your healthcare provider if you think your medicine is not helping or if you have side effects  Tell him of her if you are allergic to any medicine  Keep a list of the medicines, vitamins, and herbs you take  Include the amounts, and when and why you take them  Bring the list or the pill bottles to follow-up visits  Carry your medicine list with you in case of an emergency  Follow up with your doctor as directed:  Write down your questions so you remember to ask them during your visits  Manage ADHD:   Reduce stress  Stress may make your ADHD worse  Ask about ways to calm your body and mind  These may include deep breathing, muscle relaxation, music, and biofeedback  Talk to someone about things that upset you  Learn more about ADHD    The more you know about ADHD, the better you will be able to help yourself  Read books, work with your therapist, and find the support of other people with ADHD  Do not drink alcohol  Alcohol may make your symptoms worse  Create a regular sleep schedule  Sleep can help decrease your symptoms  Try to go to bed and wake up at the same times each day  Do not watch TV, use the computer, or play video games before bed  Electronic devices can make it hard for you to sleep or to stay asleep  Eat a variety of healthy foods  Healthy foods can help increase your concentration and make you feel calmer  Healthy foods include fruits, vegetables, low-fat dairy products, lean meats, fish, whole-grain breads, and cooked beans  Ask your healthcare provider if you need to be on a special diet  © Copyright [employer] 2022 Information is for End User's use only and may not be sold, redistributed or otherwise used for commercial purposes  All illustrations and images included in CareNotes® are the copyrighted property of A [employer] , Inc  or [facility] Harriett Sutton   The above information is an  only  It is not intended as medical advice for individual conditions or treatments  Talk to your doctor, nurse or pharmacist before following any medical regimen to see if it is safe and effective for you

## 2022-06-06 ENCOUNTER — TELEPHONE (OUTPATIENT)
Dept: FAMILY MEDICINE CLINIC | Facility: CLINIC | Age: 62
End: 2022-06-06

## 2022-06-06 NOTE — TELEPHONE ENCOUNTER
Would like to talk to you about amphetamine-dextroamphetamine (ADDERALL, 10MG,) 10 mg tablet  Patient would like to switch to Wellbutrin single release 12 hour and Adderall 5mg as needed

## 2022-06-07 NOTE — ASSESSMENT & PLAN NOTE
Patient has been on Strattera for many years  Reports that she does not feel like it is working  Medication discontinued  Will start Adderall patient advised to take only when working  Monitor for side effects  Discussed getting adequate rest, nutrition, hydration

## 2022-06-07 NOTE — PROGRESS NOTES
Assessment/Plan:     Attention deficit hyperactivity disorder (ADHD), combined type    Patient has been on Strattera for many years  Reports that she does not feel like it is working  Medication discontinued  Will start Adderall patient advised to take only when working  Monitor for side effects  Discussed getting adequate rest, nutrition, hydration  Problem List Items Addressed This Visit        Other    Attention deficit hyperactivity disorder (ADHD), combined type - Primary       Patient has been on Strattera for many years  Reports that she does not feel like it is working  Medication discontinued  Will start Adderall patient advised to take only when working  Monitor for side effects  Discussed getting adequate rest, nutrition, hydration  Relevant Medications    amphetamine-dextroamphetamine (ADDERALL, 10MG,) 10 mg tablet      Other Visit Diagnoses     Encounter for screening mammogram for malignant neoplasm of breast        Relevant Orders    Mammo screening bilateral w 3d & cad            Subjective:      Patient ID: Karin Engle is a 64 y o  female  Patient is here with concerns that her Andrew Sauceda is not working any more  Does take it when she is at work  States that helps with her concentration  Generally feeling okay  He is to get an order for a mammogram       The following portions of the patient's history were reviewed and updated as appropriate: allergies, current medications, past family history, past medical history, past social history, past surgical history and problem list     Review of Systems   Constitutional: Negative for chills and fever  Psychiatric/Behavioral: Positive for decreased concentration  Negative for sleep disturbance  The patient is not nervous/anxious            Objective:      /88 (BP Location: Left arm, Patient Position: Sitting, Cuff Size: Standard)   Temp (!) 97 4 °F (36 3 °C) (Tympanic)   Ht 5' 6 5" (1 689 m)   Wt 76 3 kg (168 lb 3 2 oz)   BMI 26 74 kg/m²          Physical Exam  Vitals and nursing note reviewed  Constitutional:       Appearance: Normal appearance  She is well-developed  HENT:      Head: Normocephalic and atraumatic  Cardiovascular:      Rate and Rhythm: Normal rate and regular rhythm  Pulses: Normal pulses  Heart sounds: Normal heart sounds  Pulmonary:      Effort: Pulmonary effort is normal       Breath sounds: Normal breath sounds  Musculoskeletal:         General: Normal range of motion  Cervical back: Normal range of motion  Skin:     General: Skin is warm and dry  Neurological:      General: No focal deficit present  Mental Status: She is alert and oriented to person, place, and time  Psychiatric:         Mood and Affect: Mood normal          Behavior: Behavior normal          Thought Content:  Thought content normal          Judgment: Judgment normal

## 2022-06-08 NOTE — TELEPHONE ENCOUNTER
Spoke with patient    Will keep the current dosage of Adderall if it is not effective patient will call

## 2022-08-15 DIAGNOSIS — F90.2 ATTENTION DEFICIT HYPERACTIVITY DISORDER (ADHD), COMBINED TYPE: ICD-10-CM

## 2022-08-15 RX ORDER — DEXTROAMPHETAMINE SACCHARATE, AMPHETAMINE ASPARTATE, DEXTROAMPHETAMINE SULFATE AND AMPHETAMINE SULFATE 2.5; 2.5; 2.5; 2.5 MG/1; MG/1; MG/1; MG/1
10 TABLET ORAL 2 TIMES DAILY
Qty: 60 TABLET | Refills: 0 | Status: SHIPPED | OUTPATIENT
Start: 2022-08-15

## 2022-09-16 ENCOUNTER — TELEPHONE (OUTPATIENT)
Dept: ADMINISTRATIVE | Facility: OTHER | Age: 62
End: 2022-09-16

## 2022-09-16 NOTE — TELEPHONE ENCOUNTER
----- Message from Lakshim Hoskins MA sent at 9/16/2022  8:36 AM EDT -----  Regarding: Mammo  09/16/22 8:36 AM    Hello, our patient attached above has had Mammogram completed/performed  Please assist in updating the patient chart by pulling the Care Everywhere (CE) document  The date of service is 3/24/2022       Thank you,  Jud Cobos MA  [facility]

## 2022-09-19 NOTE — TELEPHONE ENCOUNTER
Upon review of the In Basket request we were able to locate, review, and update the patient chart as requested for Mammogram     Any additional questions or concerns should be emailed to the Practice Liaisons via [email or web address] email, please do not reply via In Basket      Thank you  Link Hammans, MA

## 2022-10-11 PROBLEM — J06.9 URI, ACUTE: Status: RESOLVED | Noted: 2022-05-18 | Resolved: 2022-10-11

## 2022-10-25 NOTE — PROGRESS NOTES
Assessment/Plan:     Chronic Problems:  No problem-specific Assessment & Plan notes found for this encounter  Visit Diagnosis:  Diagnoses and all orders for this visit:    Routine gynecological examination  Comments: Will call with pap results  If abnormal will refer to gyn  Orders:  -     Liquid-based pap, screening    Cervical polyp  Comments:  Two small cervical polyp noted  Will refer out if pap is abnormal      Colon cancer screening  -     Occult Blood, Fecal Immunochemical; Future    Screening mammogram, encounter for  -     Mammo screening bilateral w 3d & cad; Future    Other orders  -     Lumigan 0 01 % ophthalmic drops  -     Cholecalciferol (Vitamin D3) 50 MCG (2000 UT) CHEW; Chew          Garry Cummings is a 61 y o  female who presents for annual exam  Menses ended about 7 years ago  No intermenstrual bleeding, spotting, or discharge  The patient reports that there is not domestic violence in her life  Current contraception: none  History of abnormal Pap smear: no  Family history of uterine or ovarian cancer: yes - PGM with either uterine or ovarian cancer  Regular self breast exam: yes  History of abnormal mammogram: yes - dense breasts but never had a bx  Family history of breast cancer: Not sure if a maternal great aunt had breast cancer  History of abnormal lipids: no  Previous gyn surgeries:  yes - tubal ligation, d&c x2 after miscarriages  History of previous sti's:  no  Age at menarche: 15  Age at menopause: age 48  OB/GYN history: T-  P-  A-  L- : 6-0-3-3  Vaginal deliveries - 3  C-Sections - 0  Last pap - 11-13-17  Last mammo - 1-22-21   Last colonoscopy - 2012  Stool for ifobt - did not do     HPI  Pt is here for routine pap  Started about 1 week ago with a soreness inside the vagina or along the outside walls  Not on abx recently  Otherwise no complaints  Takes all other meds as directed  No side effects noted         The following portions of the patient's history were reviewed and updated as appropriate: allergies, current medications, past family history, past medical history, past social history, past surgical history and problem list       Review of Systems  Review of Systems   Constitutional: Negative for chills, diaphoresis, fatigue and fever  HENT: Negative for ear pain, postnasal drip, sinus pressure, sinus pain and sore throat  Eyes: Negative for pain and visual disturbance  Respiratory: Negative for cough, chest tightness and shortness of breath  Cardiovascular: Negative for chest pain and palpitations  Gastrointestinal: Negative for abdominal pain, constipation, diarrhea, nausea and vomiting  Endocrine:        Had her first covid shot  Genitourinary: Negative for dysuria, frequency and urgency  Musculoskeletal: Negative for arthralgias, gait problem and myalgias  Skin: Negative for rash and wound  Neurological: Negative for dizziness, light-headedness and numbness  Psychiatric/Behavioral: Negative for dysphoric mood and sleep disturbance  The patient is not nervous/anxious          /86   Pulse 64   Temp (!) 96 8 °F (36 °C)   Resp 14   Ht 5' 6" (1 676 m)   Wt 72 1 kg (159 lb) Comment: 156 6 at home  SpO2 99%   BMI 25 66 kg/m²   Social History     Socioeconomic History    Marital status: /Civil Union     Spouse name: Not on file    Number of children: 3    Years of education: Not on file    Highest education level: Not on file   Occupational History    Not on file   Social Needs    Financial resource strain: Not on file    Food insecurity     Worry: Not on file     Inability: Not on file   [employer] needs     Medical: Not on file     Non-medical: Not on file   Tobacco Use    Smoking status: Former Smoker    Smokeless tobacco: Never Used   Substance and Sexual Activity    Alcohol use: Yes     Comment: CONSUMES ALCOHOL AT SOCIAL EVENTS; SOCIAL USE     Drug use: No    Sexual activity: Not on file Lifestyle    Physical activity     Days per week: Not on file     Minutes per session: Not on file    Stress: Not on file   Relationships    Social connections     Talks on phone: Not on file     Gets together: Not on file     Attends Cheondoism service: Not on file     Active member of club or organization: Not on file     Attends meetings of clubs or organizations: Not on file     Relationship status: Not on file    Intimate partner violence     Fear of current or ex partner: Not on file     Emotionally abused: Not on file     Physically abused: Not on file     Forced sexual activity: Not on file   Other Topics Concern    Not on file   Social History Narrative    Always uses seat belt    Daily caffeine consumption    Full-time employment     Past Medical History:   Diagnosis Date    Basal cell carcinoma (BCC) of back      Family History   Problem Relation Age of Onset    Diverticulitis Mother     Diabetes Mother     Hypertension Mother     Osteoporosis Mother     Varicose Veins Mother     Diabetes Father     Hypertension Father     Mental illness Brother     Other Paternal Grandmother         BREAST DISORDER    Diverticulitis Maternal Aunt     Osteoporosis Maternal Aunt     Diabetes Family     Hyperlipidemia Family     Hypertension Family     Mental illness Daughter      Past Surgical History:   Procedure Laterality Date    DILATION AND CURETTAGE OF UTERUS      LEG SURGERY Left     1998 BY DR Sonido Teresa; 2005 (AROUND) BY A DR AT [facility]    TUBAL LIGATION  1997    VEIN LIGATION AND STRIPPING      + SKIN GRAFT OR VENOUS INTERRUPTION; BY DR Vannessa SU        Current Outpatient Medications:     atoMOXetine (STRATTERA) 40 mg capsule, Take 1 capsule (40 mg total) by mouth daily, Disp: 90 capsule, Rfl: 1    Cholecalciferol (Vitamin D3) 50 MCG (2000 UT) CHEW, Chew, Disp: , Rfl:     Lumigan 0 01 % ophthalmic drops, , Disp: , Rfl:     Multiple Vitamin tablet, Take by mouth, Disp: , Rfl: Results for orders placed or performed in visit on 03/13/21   CBC and differential   Result Value Ref Range    White Blood Cell Count 6 2 3 4 - 10 8 x10E3/uL    Red Blood Cell Count 4 97 3 77 - 5 28 x10E6/uL    Hemoglobin 14 3 11 1 - 15 9 g/dL    HCT 43 9 34 0 - 46 6 %    MCV 88 79 - 97 fL    MCH 28 8 26 6 - 33 0 pg    MCHC 32 6 31 5 - 35 7 g/dL    RDW 12 5 11 7 - 15 4 %    Platelet Count 577 379 - 450 x10E3/uL    Neutrophils 52 Not Estab  %    Lymphocytes 39 Not Estab  %    Monocytes 6 Not Estab  %    Eosinophils 2 Not Estab  %    Basophils PCT 1 Not Estab  %    Neutrophils (Absolute) 3 2 1 4 - 7 0 x10E3/uL    Lymphocytes (Absolute) 2 4 0 7 - 3 1 x10E3/uL    Monocytes (Absolute) 0 4 0 1 - 0 9 x10E3/uL    Eosinophils (Absolute) 0 1 0 0 - 0 4 x10E3/uL    Basophils ABS 0 0 0 0 - 0 2 x10E3/uL    Immature Granulocytes 0 Not Estab  %    Immature Granulocytes (Absolute) 0 0 0 0 - 0 1 x10E3/uL   Comprehensive metabolic panel   Result Value Ref Range    Glucose, Random 85 65 - 99 mg/dL    BUN 14 8 - 27 mg/dL    Creatinine 0 80 0 57 - 1 00 mg/dL    eGFR Non African American 80 >59 mL/min/1 73    eGFR  93 >59 mL/min/1 73    SL AMB BUN/CREATININE RATIO 18 12 - 28    Sodium 139 134 - 144 mmol/L    Potassium 4 2 3 5 - 5 2 mmol/L    Chloride 101 96 - 106 mmol/L    CO2 25 20 - 29 mmol/L    CALCIUM 9 8 8 7 - 10 3 mg/dL    Protein, Total 6 8 6 0 - 8 5 g/dL    Albumin 4 6 3 8 - 4 9 g/dL    Globulin, Total 2 2 1 5 - 4 5 g/dL    Albumin/Globulin Ratio 2 1 1 2 - 2 2    TOTAL BILIRUBIN 0 4 0 0 - 1 2 mg/dL    Alk Phos Isoenzymes 73 39 - 117 IU/L    AST 18 0 - 40 IU/L    ALT 20 0 - 32 IU/L   Lipid panel   Result Value Ref Range    Cholesterol, Total 242 (H) 100 - 199 mg/dL    Triglycerides 119 0 - 149 mg/dL    HDL 58 >39 mg/dL    VLDL Cholesterol Calculated 21 5 - 40 mg/dL    LDL Calculated 163 (H) 0 - 99 mg/dL   Human Immunodeficiency Virus 1/2 Antigen / Antibody ( Fourth Generation) with Reflex Testing   Result Value Ref Range    HIV Screen 4th Generation wRflx Non Reactive Non Reactive   Hepatitis C antibody   Result Value Ref Range    HEP C AB <0 1 0 0 - 0 9 s/co ratio   TSH, 3rd generation with Free T4 reflex   Result Value Ref Range    TSH 1 940 0 450 - 4 500 uIU/mL       Objective     Lab Review   Orders Only on 03/13/2021   Component Date Value    White Blood Cell Count 03/13/2021 6 2     Red Blood Cell Count 03/13/2021 4 97     Hemoglobin 03/13/2021 14 3     HCT 03/13/2021 43 9     MCV 03/13/2021 88     MCH 03/13/2021 28 8     MCHC 03/13/2021 32 6     RDW 03/13/2021 12 5     Platelet Count 53/52/8950 311     Neutrophils 03/13/2021 52     Lymphocytes 03/13/2021 39     Monocytes 03/13/2021 6     Eosinophils 03/13/2021 2     Basophils PCT 03/13/2021 1     Neutrophils (Absolute) 03/13/2021 3 2     Lymphocytes (Absolute) 03/13/2021 2 4     Monocytes (Absolute) 03/13/2021 0 4     Eosinophils (Absolute) 03/13/2021 0 1     Basophils ABS 03/13/2021 0 0     Immature Granulocytes 03/13/2021 0     Immature Granulocytes (A* 03/13/2021 0 0     Glucose, Random 03/13/2021 85     BUN 03/13/2021 14     Creatinine 03/13/2021 0 80     eGFR Non  03/13/2021 80     eGFR  03/13/2021 93     SL AMB BUN/CREATININE RA* 03/13/2021 18     Sodium 03/13/2021 139     Potassium 03/13/2021 4 2     Chloride 03/13/2021 101     CO2 03/13/2021 25     CALCIUM 03/13/2021 9 8     Protein, Total 03/13/2021 6 8     Albumin 03/13/2021 4 6     Globulin, Total 03/13/2021 2 2     Albumin/Globulin Ratio 03/13/2021 2 1     TOTAL BILIRUBIN 03/13/2021 0 4     Alk Phos Isoenzymes 03/13/2021 73     AST 03/13/2021 18     ALT 03/13/2021 20     Cholesterol, Total 03/13/2021 242*    Triglycerides 03/13/2021 119     HDL 03/13/2021 58     VLDL Cholesterol Calcula* 03/13/2021 21     LDL Calculated 03/13/2021 163*    HIV Screen 4th Generatio* 03/13/2021 Non Reactive     HEP C AB 03/13/2021 <0 1     TSH 03/13/2021 1 940           Imaging: No results found  Physical Exam  Vitals signs and nursing note reviewed  Constitutional:       General: She is not in acute distress  Appearance: Normal appearance  She is well-developed  She is not ill-appearing, toxic-appearing or diaphoretic  HENT:      Head: Normocephalic and atraumatic  Right Ear: Tympanic membrane, ear canal and external ear normal  There is no impacted cerumen  Left Ear: Tympanic membrane, ear canal and external ear normal  There is no impacted cerumen  Nose: Nose normal  No congestion  Mouth/Throat:      Mouth: Mucous membranes are moist       Pharynx: No oropharyngeal exudate  Eyes:      General:         Right eye: No discharge  Left eye: No discharge  Extraocular Movements: Extraocular movements intact  Conjunctiva/sclera: Conjunctivae normal       Pupils: Pupils are equal, round, and reactive to light  Neck:      Musculoskeletal: Normal range of motion and neck supple  No neck rigidity  Cardiovascular:      Rate and Rhythm: Normal rate and regular rhythm  Heart sounds: No murmur  Pulmonary:      Effort: Pulmonary effort is normal  No respiratory distress  Breath sounds: Normal breath sounds  Abdominal:      Hernia: There is no hernia in the left inguinal area or right inguinal area  Genitourinary:     Exam position: Supine  Pubic Area: No rash or pubic lice  Labia:         Right: No rash, tenderness, lesion or injury  Left: No rash, tenderness, lesion or injury  Urethra: No prolapse  Vagina: Normal       Cervix: Lesion (2 small cervical polyps noted  ) present  No cervical motion tenderness, discharge, friability, erythema, cervical bleeding or eversion  Uterus: Normal        Adnexa:         Right: No mass, tenderness or fullness  Left: No mass, tenderness or fullness  Musculoskeletal: Normal range of motion           General: No deformity  Right lower leg: No edema  Left lower leg: No edema  Skin:     General: Skin is warm  Capillary Refill: Capillary refill takes less than 2 seconds  Coloration: Skin is not pale  Findings: No erythema  Neurological:      General: No focal deficit present  Mental Status: She is alert and oriented to person, place, and time  Psychiatric:         Mood and Affect: Mood normal          Behavior: Behavior normal          Thought Content: Thought content normal          Judgment: Judgment normal            Portions of the record may have been created with voice recognition software   Occasional wrong word or "sound a like" substitutions may have occurred due to the inherent limitations of voice recognition software   Read the chart carefully and recognize, using context, where substitutions have occurred  povidone iodine

## 2022-11-11 ENCOUNTER — OFFICE VISIT (OUTPATIENT)
Dept: FAMILY MEDICINE CLINIC | Facility: CLINIC | Age: 62
End: 2022-11-11

## 2022-11-11 VITALS
OXYGEN SATURATION: 97 % | DIASTOLIC BLOOD PRESSURE: 74 MMHG | RESPIRATION RATE: 18 BRPM | SYSTOLIC BLOOD PRESSURE: 118 MMHG | HEIGHT: 66 IN | BODY MASS INDEX: 26.84 KG/M2 | HEART RATE: 75 BPM | WEIGHT: 167 LBS | TEMPERATURE: 97.5 F

## 2022-11-11 DIAGNOSIS — Z00.00 HEALTH CARE MAINTENANCE: Primary | ICD-10-CM

## 2022-11-11 DIAGNOSIS — F90.2 ATTENTION DEFICIT HYPERACTIVITY DISORDER (ADHD), COMBINED TYPE: ICD-10-CM

## 2022-11-11 DIAGNOSIS — M67.431 GANGLION CYST OF DORSUM OF RIGHT WRIST: ICD-10-CM

## 2022-11-11 RX ORDER — DEXTROAMPHETAMINE SACCHARATE, AMPHETAMINE ASPARTATE, DEXTROAMPHETAMINE SULFATE AND AMPHETAMINE SULFATE 2.5; 2.5; 2.5; 2.5 MG/1; MG/1; MG/1; MG/1
10 TABLET ORAL 2 TIMES DAILY
Qty: 60 TABLET | Refills: 0 | Status: SHIPPED | OUTPATIENT
Start: 2022-11-11

## 2022-11-11 NOTE — PROGRESS NOTES
Name: Yrn Chino      : 1960      MRN: [id number]  Encounter Provider: SUSHILA Tejada  Encounter Date: 2022   Encounter department: [address] [place] [address]  Health care maintenance  Assessment & Plan:  Get blood work done prior to annual visit  Discussed self-care continue with low-salt heart healthy diet    Orders:  -     CBC and differential; Future  -     Comprehensive metabolic panel; Future  -     Lipid panel; Future    2  Attention deficit hyperactivity disorder (ADHD), combined type  Assessment & Plan:  Continue same dose of medication  Maintain good nutrition rest and hydration    Orders:  -     amphetamine-dextroamphetamine (ADDERALL, 10MG,) 10 mg tablet; Take 1 tablet (10 mg total) by mouth 2 (two) times a day Max Daily Amount: 20 mg    3  Ganglion cyst of dorsum of right wrist  Assessment & Plan:  Education provided regarding ganglion cyst   Will continue to monitor, patient declined any intervention at this time          Depression Screening and Follow-up Plan: Patient was screened for depression during today's encounter  They screened negative with a PHQ-2 score of 0  Subjective      Patient is here to follow-up on attention deficit medication  Feels that the medication has been helping  Would like to maintain the same dose  Patient does have a ganglion cyst    Review of Systems   Constitutional: Negative  HENT: Negative  Eyes: Negative  Respiratory: Negative  Cardiovascular: Negative  Gastrointestinal: Negative  Endocrine: Negative  Genitourinary: Negative  Musculoskeletal: Negative  Allergic/Immunologic: Negative  Neurological: Negative  Psychiatric/Behavioral: Positive for decreased concentration         Current Outpatient Medications on File Prior to Visit   Medication Sig   • Cholecalciferol (Vitamin D3) 50 MCG ( UT) CHEW Chew   • Lumigan 0 01 % ophthalmic drops    • Multiple Vitamin tablet Take by mouth   • ondansetron (ZOFRAN-ODT) 4 mg disintegrating tablet TAKE 1 TAB BY MOUTH EVERY 6 HOURS AS NEEDED FOR NAUSEA OR VOMITING   • Thiamine HCl (vitamin B-1) 250 MG tablet Take 250 mg by mouth daily   • [DISCONTINUED] amphetamine-dextroamphetamine (ADDERALL, 10MG,) 10 mg tablet Take 1 tablet (10 mg total) by mouth 2 (two) times a day Max Daily Amount: 20 mg       Objective     /74   Pulse 75   Temp 97 5 °F (36 4 °C) (Temporal)   Resp 18   Ht 5' 6" (1 676 m)   Wt 75 8 kg (167 lb)   SpO2 97%   BMI 26 95 kg/m²     Physical Exam  Vitals and nursing note reviewed  Constitutional:       Appearance: Normal appearance  She is well-developed  HENT:      Head: Normocephalic and atraumatic  Cardiovascular:      Rate and Rhythm: Normal rate and regular rhythm  Pulses: Normal pulses  Heart sounds: Normal heart sounds  Pulmonary:      Effort: Pulmonary effort is normal       Breath sounds: Normal breath sounds  Abdominal:      Palpations: Abdomen is soft  Musculoskeletal:         General: Normal range of motion  Cervical back: Normal range of motion  Skin:     General: Skin is warm and dry  Neurological:      General: No focal deficit present  Mental Status: She is alert and oriented to person, place, and time  Psychiatric:         Mood and Affect: Mood normal          Behavior: Behavior normal          Thought Content:  Thought content normal          Judgment: Judgment normal        SUSHILA Diaz

## 2022-11-11 NOTE — ASSESSMENT & PLAN NOTE
Get blood work done prior to annual visit    Discussed self-care continue with low-salt heart healthy diet

## 2022-11-11 NOTE — ASSESSMENT & PLAN NOTE
Education provided regarding ganglion cyst   Will continue to monitor, patient declined any intervention at this time

## 2023-01-10 PROBLEM — Z00.00 HEALTH CARE MAINTENANCE: Status: RESOLVED | Noted: 2022-03-30 | Resolved: 2023-01-10

## 2023-03-09 ENCOUNTER — HOSPITAL ENCOUNTER (EMERGENCY)
Facility: HOSPITAL | Age: 63
Discharge: HOME | End: 2023-03-10
Attending: EMERGENCY MEDICINE

## 2023-03-09 VITALS
SYSTOLIC BLOOD PRESSURE: 129 MMHG | DIASTOLIC BLOOD PRESSURE: 46 MMHG | RESPIRATION RATE: 18 BRPM | HEART RATE: 60 BPM | OXYGEN SATURATION: 99 % | TEMPERATURE: 97.4 F

## 2023-03-09 DIAGNOSIS — S61.219A FINGER LACERATION: Primary | ICD-10-CM

## 2023-03-09 RX ORDER — LIDOCAINE HYDROCHLORIDE 20 MG/ML
5 INJECTION, SOLUTION EPIDURAL; INFILTRATION; INTRACAUDAL; PERINEURAL ONCE
Status: COMPLETED | OUTPATIENT
Start: 2023-03-09 | End: 2023-03-10

## 2023-03-09 NOTE — Clinical Note
Viki Cash was seen and treated in our emergency department on 3/9/2023  Diagnosis:     Illise    She may return on this date: 03/12/2023         If you have any questions or concerns, please don't hesitate to call        Colt Pepper DO    ______________________________           _______________          _______________  Hospital Representative                              Date                                Time

## 2023-03-10 RX ORDER — ONDANSETRON 4 MG/1
4 TABLET, ORALLY DISINTEGRATING ORAL ONCE
Status: COMPLETED | OUTPATIENT
Start: 2023-03-10 | End: 2023-03-10

## 2023-03-10 RX ADMIN — ONDANSETRON 4 MG: 4 TABLET, ORALLY DISINTEGRATING ORAL at 00:39

## 2023-03-10 RX ADMIN — LIDOCAINE HYDROCHLORIDE 5 ML: 20 INJECTION, SOLUTION EPIDURAL; INFILTRATION; INTRACAUDAL; PERINEURAL at 00:10

## 2023-03-10 NOTE — ED PROVIDER NOTES
History  Chief Complaint   Patient presents with   • Finger Laceration     Pt arrive c/o laceration to R ring finger after cleaning   Denies thinners  Bleeding under controlled       Patient is a 71-year-old female past medical history of hyperlipidemia, ADHD, basal cell carcinoma, anxiety presenting with right ring finger laceration  Patient states that roughly 1 hour ago she was cleaning a  when she looks up to speak to her  and cut the distal tip of her ring finger on the blade  Has not take any medication for pain and notes constant burning pain to the area  Denies any numbness or tingling or use of blood thinners  Last tetanus shot was within the last 5 years  Prior to Admission Medications   Prescriptions Last Dose Informant Patient Reported? Taking?    Cholecalciferol (Vitamin D3) 50 MCG (2000 UT) CHEW   Yes No   Sig: Chew   Lumigan 0 01 % ophthalmic drops   Yes No   Multiple Vitamin tablet   Yes No   Sig: Take by mouth   Thiamine HCl (vitamin B-1) 250 MG tablet   Yes No   Sig: Take 250 mg by mouth daily   amphetamine-dextroamphetamine (ADDERALL, 10MG,) 10 mg tablet   No No   Sig: Take 1 tablet (10 mg total) by mouth 2 (two) times a day Max Daily Amount: 20 mg   ondansetron (ZOFRAN-ODT) 4 mg disintegrating tablet   Yes No   Sig: TAKE 1 TAB BY MOUTH EVERY 6 HOURS AS NEEDED FOR NAUSEA OR VOMITING      Facility-Administered Medications: None       Past Medical History:   Diagnosis Date   • Basal cell carcinoma (BCC) of back        Past Surgical History:   Procedure Laterality Date   • DILATION AND CURETTAGE OF UTERUS     • LEG SURGERY Left     1998 BY DR Roel Carvalho; 2005 (AROUND) BY A DR AT [facility]   • TUBAL LIGATION  1997   • VEIN LIGATION AND STRIPPING      + SKIN GRAFT OR VENOUS INTERRUPTION; BY DR Helene SU        Family History   Problem Relation Age of Onset   • Diverticulitis Mother    • Diabetes Mother    • Hypertension Mother    • Osteoporosis Mother    • Varicose Veins Mother    • Diabetes Father    • Hypertension Father    • Mental illness Brother    • Other Paternal Grandmother         BREAST DISORDER   • Diverticulitis Maternal Aunt    • Osteoporosis Maternal Aunt    • Diabetes Family    • Hyperlipidemia Family    • Hypertension Family    • Mental illness Daughter      I have reviewed and agree with the history as documented  E-Cigarette/Vaping   • E-Cigarette Use Former User      E-Cigarette/Vaping Substances     Social History     Tobacco Use   • Smoking status: Former   • Smokeless tobacco: Never   Vaping Use   • Vaping Use: Former   Substance Use Topics   • Alcohol use: Yes     Comment: CONSUMES ALCOHOL AT SOCIAL EVENTS; SOCIAL USE    • Drug use: Yes     Types: Marijuana     Comment: medical        Review of Systems   All other systems reviewed and are negative  Physical Exam  Physical Exam  Vitals reviewed  Constitutional:       General: She is not in acute distress  Appearance: Normal appearance  She is not ill-appearing  HENT:      Mouth/Throat:      Mouth: Mucous membranes are moist    Eyes:      Conjunctiva/sclera: Conjunctivae normal    Cardiovascular:      Rate and Rhythm: Normal rate  Pulmonary:      Effort: Pulmonary effort is normal    Musculoskeletal:         General: Tenderness present  No swelling  Normal range of motion  Cervical back: Neck supple  Comments: Patient has 1 cm laceration to the palmar aspect of the pulp distal right fourth digit through the skin and subcutaneous tissue, no nail involvement   Skin:     General: Skin is warm and dry  Capillary Refill: Capillary refill takes less than 2 seconds  Neurological:      General: No focal deficit present  Mental Status: She is alert  Sensory: No sensory deficit  Motor: No weakness     Psychiatric:         Mood and Affect: Mood normal          Vital Signs  ED Triage Vitals [03/09/23 2317]   Temperature Pulse Respirations Blood Pressure SpO2   (!) 97 4 °F (36 3 °C) 60 18 (!) 129/46 99 %      Temp Source Heart Rate Source Patient Position - Orthostatic VS BP Location FiO2 (%)   Oral Monitor -- Left arm --      Pain Score       --           Vitals:    03/09/23 2317   BP: (!) 129/46   Pulse: 60         Visual Acuity      ED Medications  Medications   lidocaine (PF) (XYLOCAINE-MPF) 2 % injection 5 mL (5 mL Infiltration Given 3/10/23 0010)   ondansetron (ZOFRAN-ODT) dispersible tablet 4 mg (4 mg Oral Given 3/10/23 0039)       Diagnostic Studies  Results Reviewed     None                 No orders to display              Procedures  Laceration repair    Date/Time: 3/10/2023 1:36 AM  Performed by: Minna Fitch DO  Authorized by: Minna Fitch DO   Consent: Verbal consent obtained  Consent given by: patient  Patient understanding: patient states understanding of the procedure being performed  Patient consent: the patient's understanding of the procedure matches consent given  Procedure consent: procedure consent matches procedure scheduled  Relevant documents: relevant documents present and verified  Test results: test results available and properly labeled  Site marked: the operative site was marked  Radiology Images displayed and confirmed   If images not available, report reviewed: imaging studies available  Patient identity confirmed: verbally with patient  Body area: upper extremity  Location details: right ring finger  Laceration length: 1 cm  Tendon involvement: none  Nerve involvement: none  Anesthesia: nerve block    Anesthesia:  Local Anesthetic: lidocaine 2% without epinephrine    Wound Dehiscence:  Superficial Wound Dehiscence: simple closure      Procedure Details:  Irrigation solution: saline  Irrigation method: syringe  Amount of cleaning: standard  Debridement: none  Degree of undermining: none  Skin closure: 4-0 nylon  Number of sutures: 3  Technique: simple  Approximation: close  Approximation difficulty: simple               ED Course  ED Course as of 03/10/23 0137   Fri Mar 10, 2023   0029 3 rosemary                                             Medical Decision Making  Patient is 70-year-old female past medical history of hyperlipidemia, ADHD, basal cell carcinoma presenting with finger laceration  Patient is well-appearing at bedside with stable vitals and in no acute distress  She is neurovascularly intact and has roughly 1 cm laceration to the pulp of the distal right fourth digit  Patient up-to-date with tetanus, will numb, repair and discharge  Risk  Prescription drug management  Disposition  Final diagnoses:   Finger laceration     Time reflects when diagnosis was documented in both MDM as applicable and the Disposition within this note     Time User Action Codes Description Comment    3/10/2023 12:29 AM Veronique Steel [I37 813A] Finger laceration       ED Disposition     ED Disposition   Discharge    Condition   Stable    Date/Time   Fri Mar 10, 2023 12:29 AM    Comment   Tima Seals discharge to home/self care                 Follow-up Information     Follow up With Specialties Details Why [facility] Emergency Department Emergency Medicine In 10 days If symptoms worsen [address] Emergency Department, [address] [place] [address], [id number]          Discharge Medication List as of 3/10/2023 12:30 AM      CONTINUE these medications which have NOT CHANGED    Details   amphetamine-dextroamphetamine (ADDERALL, 10MG,) 10 mg tablet Take 1 tablet (10 mg total) by mouth 2 (two) times a day Max Daily Amount: 20 mg, Starting Fri 11/11/2022, Normal      Cholecalciferol (Vitamin D3) 50 MCG (2000 UT) CHEW Chew, Historical Med      Lumigan 0 01 % ophthalmic drops Starting Tue 3/30/2021, Historical Med      Multiple Vitamin tablet Take by mouth, Historical Med      ondansetron (ZOFRAN-ODT) 4 mg disintegrating tablet TAKE 1 TAB BY MOUTH EVERY 6 HOURS AS NEEDED FOR NAUSEA OR VOMITING, Historical Med      Thiamine HCl (vitamin B-1) 250 MG tablet Take 250 mg by mouth daily, Historical Med             No discharge procedures on file      PDMP Review       Value Time User    PDMP Reviewed  Yes 11/11/2022  8:48 AM Savanah Chang, 10 [facility] Provider  Electronically Signed by           Peter Alaniz DO  03/10/23 3648

## 2023-03-21 ENCOUNTER — TELEPHONE (OUTPATIENT)
Dept: FAMILY MEDICINE CLINIC | Facility: CLINIC | Age: 63
End: 2023-03-21

## 2023-03-21 DIAGNOSIS — F90.2 ATTENTION DEFICIT HYPERACTIVITY DISORDER (ADHD), COMBINED TYPE: ICD-10-CM

## 2023-03-21 RX ORDER — DEXTROAMPHETAMINE SACCHARATE, AMPHETAMINE ASPARTATE, DEXTROAMPHETAMINE SULFATE AND AMPHETAMINE SULFATE 2.5; 2.5; 2.5; 2.5 MG/1; MG/1; MG/1; MG/1
10 TABLET ORAL 2 TIMES DAILY
Qty: 60 TABLET | Refills: 0 | Status: CANCELLED | OUTPATIENT
Start: 2023-03-21

## 2023-03-21 RX ORDER — DEXTROAMPHETAMINE SACCHARATE, AMPHETAMINE ASPARTATE, DEXTROAMPHETAMINE SULFATE AND AMPHETAMINE SULFATE 2.5; 2.5; 2.5; 2.5 MG/1; MG/1; MG/1; MG/1
10 TABLET ORAL 2 TIMES DAILY
Qty: 60 TABLET | Refills: 0 | Status: SHIPPED | OUTPATIENT
Start: 2023-03-21 | End: 2023-03-24 | Stop reason: SDUPTHER

## 2023-03-21 NOTE — TELEPHONE ENCOUNTER
Patient would like a refill of amphetamine-dextroamphetamine (ADDERALL, 10MG,) 10 mg tablet    Thank You

## 2023-03-22 ENCOUNTER — OFFICE VISIT (OUTPATIENT)
Dept: FAMILY MEDICINE CLINIC | Facility: CLINIC | Age: 63
End: 2023-03-22

## 2023-03-22 VITALS
SYSTOLIC BLOOD PRESSURE: 108 MMHG | HEIGHT: 66 IN | WEIGHT: 166 LBS | OXYGEN SATURATION: 98 % | TEMPERATURE: 97.5 F | HEART RATE: 65 BPM | BODY MASS INDEX: 26.68 KG/M2 | DIASTOLIC BLOOD PRESSURE: 74 MMHG

## 2023-03-22 DIAGNOSIS — L03.011 CELLULITIS OF FINGER OF RIGHT HAND: ICD-10-CM

## 2023-03-22 DIAGNOSIS — S61.214D LACERATION OF RIGHT RING FINGER WITHOUT FOREIGN BODY WITHOUT DAMAGE TO NAIL, SUBSEQUENT ENCOUNTER: Primary | ICD-10-CM

## 2023-03-22 DIAGNOSIS — Z23 ENCOUNTER FOR IMMUNIZATION: ICD-10-CM

## 2023-03-22 NOTE — PATIENT INSTRUCTIONS
Stitches Removal   WHAT YOU NEED TO KNOW:   Stitches are usually removed within 14 days, depending on the location of the wound  Your healthcare provider will tell you when to return to have your stitches removed  Your provider will use sterile forceps or tweezers to  the knot of each stitch  He or she will cut the stitch with scissors and pull the stitch out  You may feel a slight tug as the stitch comes out  DISCHARGE INSTRUCTIONS:   Return to the emergency department if:   Your wound splits open or is starting to come apart  You suddenly cannot move your injured joint  You have sudden numbness around your wound  You see red streaks coming from your wound  Contact your healthcare provider if:   You have a fever and chills  Your wound is red, warm, swollen, or leaking pus  There is a bad smell coming from your wound  You have increased pain in the wound area  You have questions or concerns about your condition or care  Care for the area after the stitches are removed:   Do not pull medical tape off  Your provider may place small strips of medical tape across your wound after the stitches have been removed  These strips will peel and fall of on their own  Do not pull them off  Clean the area as directed  Carefully wash the area with soap and water  Pat the area dry with a clean towel  Check the area for signs of infection, such as redness, swelling, or pus  Also check that the wound is not coming apart  Protect your wound  Your wound can swell, bleed, or split open if it is stretched or bumped  You may need to wear a bandage that supports your wound until it is completely healed  Care for a scar  You may have a scar after the stitches are removed  Use sunblock if the area is exposed to the sun  Apply it every day after the stitches are removed  This will help prevent skin discoloration   Talk to your healthcare provider about medicines you can use to make the scar less visible  Some medicines are available without a prescription  Follow up with your healthcare provider as directed: You may need to return in 3 to 5 days if the stitches are on your face  Stitches on your scalp need to be removed after 7 to 14 days  Stitches over joints may remain in place up to 14 days  Write down your questions so you remember to ask them during your visits  © Copyright [employer]ie Lugo 2022 Information is for End User's use only and may not be sold, redistributed or otherwise used for commercial purposes  The above information is an  only  It is not intended as medical advice for individual conditions or treatments  Talk to your doctor, nurse or pharmacist before following any medical regimen to see if it is safe and effective for you

## 2023-03-23 PROBLEM — S61.214A: Status: ACTIVE | Noted: 2023-03-23

## 2023-03-23 NOTE — ASSESSMENT & PLAN NOTE
3 sutures removed  No drainage noted to site  Does have some redness and swelling  Discussed management  If continues to have redness and swelling will evaluate the need to order an antibiotic for infection    Tetanus shot also given last tetanus was in 2008

## 2023-03-23 NOTE — PROGRESS NOTES
Name: Esdras Forrest      : 1960      MRN: [id number]  Encounter Provider: SUSHILA Barnes  Encounter Date: 3/22/2023   Encounter department: [address] [place] [address] PRIMARY CARE    Assessment & Plan     1  Laceration of right ring finger without foreign body without damage to nail, subsequent encounter  Assessment & Plan:  3 sutures removed  No drainage noted to site  Does have some redness and swelling  Discussed management  If continues to have redness and swelling will evaluate the need to order an antibiotic for infection  Tetanus shot also given last tetanus was in     Orders:  -     Suture removal    2  Encounter for immunization  -     TDAP VACCINE GREATER THAN OR EQUAL TO 6YO IM    3  Cellulitis of finger of right hand  -     clarithromycin (BIAXIN XL) 500 MG 24 hr tablet; Take 2 tablets (1,000 mg total) by mouth daily for 7 days      BMI Counseling: Body mass index is 26 79 kg/m²  The BMI is above normal  Nutrition recommendations include decreasing portion sizes, encouraging healthy choices of fruits and vegetables, decreasing fast food intake, consuming healthier snacks, limiting drinks that contain sugar, moderation in carbohydrate intake, increasing intake of lean protein, reducing intake of saturated and trans fat and reducing intake of cholesterol  Exercise recommendations include exercising 3-5 times per week and strength training exercises  No pharmacotherapy was ordered  Rationale for BMI follow-up plan is due to patient being overweight or obese  Depression Screening and Follow-up Plan: Patient was screened for depression during today's encounter  They screened negative with a PHQ-2 score of 0  Subjective      Patient is here for suture removal   Had a laceration on   Cut her finger on a  blade  Was seen in the emergency room  Had sutures done  Did not get tetanus vaccine  finger, no drainage      Review of Systems   Constitutional: Negative for chills and fever  Skin: Positive for wound  Current Outpatient Medications on File Prior to Visit   Medication Sig   • amphetamine-dextroamphetamine (ADDERALL, 10MG,) 10 mg tablet Take 1 tablet (10 mg total) by mouth 2 (two) times a day Max Daily Amount: 20 mg   • Cholecalciferol (Vitamin D3) 50 MCG (2000 UT) CHEW Chew   • Lumigan 0 01 % ophthalmic drops    • Multiple Vitamin tablet Take by mouth   • ondansetron (ZOFRAN-ODT) 4 mg disintegrating tablet TAKE 1 TAB BY MOUTH EVERY 6 HOURS AS NEEDED FOR NAUSEA OR VOMITING   • Thiamine HCl (vitamin B-1) 250 MG tablet Take 250 mg by mouth daily       Objective     /74 (BP Location: Left arm, Patient Position: Sitting, Cuff Size: Large)   Pulse 65   Temp 97 5 °F (36 4 °C) (Temporal)   Ht 5' 6" (1 676 m)   Wt 75 3 kg (166 lb)   SpO2 98%   BMI 26 79 kg/m²   Suture removal    Date/Time: 3/22/2023 5:15 AM  Performed by: SUSHILA Ashley  Authorized by: SUSHILA Ashley   Universal Protocol:  Patient understanding: patient states understanding of the procedure being performed        Patient location:  Clinic  Location:     Laterality:  Right    Location:  Upper extremity    Upper extremity location:  Hand    Hand location:  R ring finger  Procedure details: Tools used:  Suture removal kit    Wound appearance:  No sign(s) of infection    Number of sutures removed:  3  Post-procedure details:     Post-removal:  Antibiotic ointment applied and Band-Aid applied    Patient tolerance of procedure:   Tolerated well, no immediate complications        Physical Exam  Asha Jerone Sandhoff

## 2023-03-24 DIAGNOSIS — F90.2 ATTENTION DEFICIT HYPERACTIVITY DISORDER (ADHD), COMBINED TYPE: ICD-10-CM

## 2023-03-24 RX ORDER — DEXTROAMPHETAMINE SACCHARATE, AMPHETAMINE ASPARTATE, DEXTROAMPHETAMINE SULFATE AND AMPHETAMINE SULFATE 2.5; 2.5; 2.5; 2.5 MG/1; MG/1; MG/1; MG/1
10 TABLET ORAL 2 TIMES DAILY
Qty: 60 TABLET | Refills: 0 | Status: SHIPPED | OUTPATIENT
Start: 2023-03-24 | End: 2023-07-17 | Stop reason: SDUPTHER

## 2023-05-22 PROBLEM — S61.214A: Status: RESOLVED | Noted: 2023-03-23 | Resolved: 2023-05-22

## 2023-07-17 DIAGNOSIS — F90.2 ATTENTION DEFICIT HYPERACTIVITY DISORDER (ADHD), COMBINED TYPE: ICD-10-CM

## 2023-07-18 RX ORDER — DEXTROAMPHETAMINE SACCHARATE, AMPHETAMINE ASPARTATE, DEXTROAMPHETAMINE SULFATE AND AMPHETAMINE SULFATE 2.5; 2.5; 2.5; 2.5 MG/1; MG/1; MG/1; MG/1
10 TABLET ORAL 2 TIMES DAILY
Qty: 60 TABLET | Refills: 0 | Status: SHIPPED | OUTPATIENT
Start: 2023-07-18

## 2023-11-22 DIAGNOSIS — F90.2 ATTENTION DEFICIT HYPERACTIVITY DISORDER (ADHD), COMBINED TYPE: ICD-10-CM

## 2023-11-22 RX ORDER — DEXTROAMPHETAMINE SACCHARATE, AMPHETAMINE ASPARTATE, DEXTROAMPHETAMINE SULFATE AND AMPHETAMINE SULFATE 2.5; 2.5; 2.5; 2.5 MG/1; MG/1; MG/1; MG/1
10 TABLET ORAL 2 TIMES DAILY
Qty: 60 TABLET | Refills: 0 | Status: SHIPPED | OUTPATIENT
Start: 2023-11-22

## 2023-12-19 ENCOUNTER — OFFICE VISIT (OUTPATIENT)
Dept: FAMILY MEDICINE CLINIC | Facility: CLINIC | Age: 63
End: 2023-12-19
Payer: COMMERCIAL

## 2023-12-19 DIAGNOSIS — J06.9 UPPER RESPIRATORY TRACT INFECTION, UNSPECIFIED TYPE: Primary | ICD-10-CM

## 2023-12-19 PROCEDURE — 99213 OFFICE O/P EST LOW 20 MIN: CPT | Performed by: NURSE PRACTITIONER

## 2023-12-19 RX ORDER — FLUTICASONE PROPIONATE 50 MCG
1 SPRAY, SUSPENSION (ML) NASAL DAILY
Qty: 11.1 ML | Refills: 3 | Status: SHIPPED | OUTPATIENT
Start: 2023-12-19

## 2023-12-19 RX ORDER — AZITHROMYCIN 250 MG/1
TABLET, FILM COATED ORAL DAILY
Qty: 6 TABLET | Refills: 0 | Status: SHIPPED | OUTPATIENT
Start: 2023-12-19 | End: 2023-12-24

## 2023-12-19 RX ORDER — PREDNISONE 20 MG/1
20 TABLET ORAL DAILY
Qty: 5 TABLET | Refills: 0 | Status: SHIPPED | OUTPATIENT
Start: 2023-12-19

## 2023-12-19 NOTE — PROGRESS NOTES
Name: Letty Blank      : 1960      MRN: [id number]  Encounter Provider: SUSHILA Weathers  Encounter Date: 2023   Encounter department: [facility] PRIMARY CARE    Assessment & Plan     1. Upper respiratory tract infection, unspecified type  Assessment & Plan:  Patient has upper respiratory symptoms ongoing for about a week.  Has been using over-the-counter medication with little help.  Reports sinus pain, pressure, sore throat, ear discomfort.  Negative for COVID.  Zithromax therapy, prednisone therapy.  Increase fluid hydration.  Steam to help decongest.  May use Tylenol for fever or headache.    Orders:  -     predniSONE 20 mg tablet; Take 1 tablet (20 mg total) by mouth daily  -     azithromycin (Zithromax) 250 mg tablet; Take 2 tablets (500 mg total) by mouth daily for 1 day, THEN 1 tablet (250 mg total) daily for 4 days.  -     fluticasone (FLONASE) 50 mcg/act nasal spray; 1 spray into each nostril daily           Subjective      Patient being seen with complaints of sinus pain, pressure ongoing for a week.  Has cough, sore throat.  Negative for COVID at home.      Review of Systems   Constitutional:  Positive for fatigue.   HENT:  Positive for sinus pressure, sinus pain and sore throat.    Eyes: Negative.    Respiratory:  Positive for cough. Negative for shortness of breath.    Cardiovascular: Negative.    Gastrointestinal: Negative.    Endocrine: Negative.    Genitourinary: Negative.    Musculoskeletal: Negative.    Allergic/Immunologic: Negative.    Neurological: Negative.    Psychiatric/Behavioral: Negative.         Current Outpatient Medications on File Prior to Visit   Medication Sig   • amphetamine-dextroamphetamine (ADDERALL, 10MG,) 10 mg tablet Take 1 tablet (10 mg total) by mouth 2 (two) times a day Max Daily Amount: 20 mg   • Cholecalciferol (Vitamin D3) 50 MCG ( UT) CHEW Chew   • Lumigan 0.01 % ophthalmic drops    • Multiple Vitamin tablet Take by mouth   •  "ondansetron (ZOFRAN-ODT) 4 mg disintegrating tablet TAKE 1 TAB BY MOUTH EVERY 6 HOURS AS NEEDED FOR NAUSEA OR VOMITING   • Thiamine HCl (vitamin B-1) 250 MG tablet Take 250 mg by mouth daily       Objective     /70   Pulse 68   Temp (!) 97.2 °F (36.2 °C) (Temporal)   Resp 16   Ht 5' 6\" (1.676 m)   Wt 76.7 kg (169 lb)   BMI 27.28 kg/m²     Physical Exam  Constitutional:       Appearance: She is ill-appearing.   HENT:      Nose: Nasal tenderness and congestion present.      Right Turbinates: Swollen.      Left Turbinates: Swollen.      Right Sinus: Frontal sinus tenderness present.      Mouth/Throat:      Pharynx: Posterior oropharyngeal erythema present.   Cardiovascular:      Rate and Rhythm: Normal rate and regular rhythm.      Pulses: Normal pulses.      Heart sounds: Normal heart sounds.   Chest:      Chest wall: Tenderness present.   Musculoskeletal:      Cervical back: Normal range of motion.   Neurological:      General: No focal deficit present.   Psychiatric:         Mood and Affect: Mood normal.         Behavior: Behavior normal.         Thought Content: Thought content normal.         Judgment: Judgment normal.       SUSHILA Weathers    "

## 2023-12-19 NOTE — ASSESSMENT & PLAN NOTE
Patient has upper respiratory symptoms ongoing for about a week.  Has been using over-the-counter medication with little help.  Reports sinus pain, pressure, sore throat, ear discomfort.  Negative for COVID.  Zithromax therapy, prednisone therapy.  Increase fluid hydration.  Steam to help decongest.  May use Tylenol for fever or headache.

## 2023-12-20 VITALS
SYSTOLIC BLOOD PRESSURE: 116 MMHG | BODY MASS INDEX: 27.16 KG/M2 | DIASTOLIC BLOOD PRESSURE: 70 MMHG | HEIGHT: 66 IN | OXYGEN SATURATION: 100 % | WEIGHT: 169 LBS | HEART RATE: 68 BPM | RESPIRATION RATE: 16 BRPM | TEMPERATURE: 97.2 F

## 2023-12-20 DIAGNOSIS — R11.0 NAUSEA: Primary | ICD-10-CM

## 2023-12-20 RX ORDER — ONDANSETRON 4 MG/1
8 TABLET, ORALLY DISINTEGRATING ORAL EVERY 8 HOURS PRN
Qty: 30 TABLET | Refills: 0 | Status: SHIPPED | OUTPATIENT
Start: 2023-12-20

## 2024-02-19 DIAGNOSIS — F90.2 ATTENTION DEFICIT HYPERACTIVITY DISORDER (ADHD), COMBINED TYPE: ICD-10-CM

## 2024-02-20 RX ORDER — DEXTROAMPHETAMINE SACCHARATE, AMPHETAMINE ASPARTATE, DEXTROAMPHETAMINE SULFATE AND AMPHETAMINE SULFATE 2.5; 2.5; 2.5; 2.5 MG/1; MG/1; MG/1; MG/1
10 TABLET ORAL 2 TIMES DAILY
Qty: 60 TABLET | Refills: 0 | Status: SHIPPED | OUTPATIENT
Start: 2024-02-20

## 2024-02-21 PROBLEM — R05.9 COUGH IN ADULT: Status: RESOLVED | Noted: 2018-12-20 | Resolved: 2024-02-21

## 2024-02-21 PROBLEM — J01.90 ACUTE NON-RECURRENT SINUSITIS: Status: RESOLVED | Noted: 2018-12-20 | Resolved: 2024-02-21

## 2024-07-10 DIAGNOSIS — F90.2 ATTENTION DEFICIT HYPERACTIVITY DISORDER (ADHD), COMBINED TYPE: ICD-10-CM

## 2024-07-10 RX ORDER — DEXTROAMPHETAMINE SACCHARATE, AMPHETAMINE ASPARTATE, DEXTROAMPHETAMINE SULFATE AND AMPHETAMINE SULFATE 2.5; 2.5; 2.5; 2.5 MG/1; MG/1; MG/1; MG/1
10 TABLET ORAL 2 TIMES DAILY
Qty: 60 TABLET | Refills: 0 | Status: SHIPPED | OUTPATIENT
Start: 2024-07-10

## 2024-07-25 ENCOUNTER — TELEPHONE (OUTPATIENT)
Age: 64
End: 2024-07-25

## 2024-07-25 DIAGNOSIS — J06.9 UPPER RESPIRATORY TRACT INFECTION, UNSPECIFIED TYPE: Primary | ICD-10-CM

## 2024-07-25 RX ORDER — AZITHROMYCIN 250 MG/1
TABLET, FILM COATED ORAL
Qty: 6 TABLET | Refills: 0 | Status: SHIPPED | OUTPATIENT
Start: 2024-07-25 | End: 2024-07-30

## 2024-07-25 NOTE — TELEPHONE ENCOUNTER
Possible sinus infection.  No availability for patient to be seen in office. Runny nose, some coughing for about 2 weeks. Took day- quil/ni-quil, then antihistamine, flonase. Nothing has helped. Is there is anything else that she can take?  Please contact patient and advise.  Thank you.

## 2024-08-20 DIAGNOSIS — Z12.31 ENCOUNTER FOR SCREENING MAMMOGRAM FOR MALIGNANT NEOPLASM OF BREAST: Primary | ICD-10-CM

## 2024-09-05 DIAGNOSIS — Z00.00 HEALTHCARE MAINTENANCE: ICD-10-CM

## 2024-09-05 DIAGNOSIS — Z78.0 POST-MENOPAUSAL: ICD-10-CM

## 2024-09-05 DIAGNOSIS — Z12.31 ENCOUNTER FOR SCREENING MAMMOGRAM FOR MALIGNANT NEOPLASM OF BREAST: ICD-10-CM

## 2024-09-05 DIAGNOSIS — E78.2 MIXED HYPERLIPIDEMIA: Primary | ICD-10-CM

## 2024-09-09 DIAGNOSIS — F90.2 ATTENTION DEFICIT HYPERACTIVITY DISORDER (ADHD), COMBINED TYPE: ICD-10-CM

## 2024-09-09 RX ORDER — DEXTROAMPHETAMINE SACCHARATE, AMPHETAMINE ASPARTATE, DEXTROAMPHETAMINE SULFATE AND AMPHETAMINE SULFATE 2.5; 2.5; 2.5; 2.5 MG/1; MG/1; MG/1; MG/1
10 TABLET ORAL 2 TIMES DAILY
Qty: 60 TABLET | Refills: 0 | Status: SHIPPED | OUTPATIENT
Start: 2024-09-09

## 2024-09-10 ENCOUNTER — TELEPHONE (OUTPATIENT)
Age: 64
End: 2024-09-10

## 2024-09-10 NOTE — TELEPHONE ENCOUNTER
Patient called to follow up on prescription for Adderall. I informed patient medication was sent to [facility] on 9/9.     I also provided central scheduling  number for patient to schedule Bone density exam and mammogram.

## 2024-09-11 ENCOUNTER — VBI (OUTPATIENT)
Dept: ADMINISTRATIVE | Facility: OTHER | Age: 64
End: 2024-09-11

## 2024-09-11 NOTE — TELEPHONE ENCOUNTER
09/11/24 2:39 PM     Chart reviewed for Mammogram was/were not submitted to the patient's insurance.     Charisma Espino MA   [facility]

## 2024-09-25 ENCOUNTER — HOSPITAL ENCOUNTER (OUTPATIENT)
Age: 64
Discharge: HOME | End: 2024-09-25
Payer: COMMERCIAL

## 2024-09-25 VITALS — HEIGHT: 65 IN | BODY MASS INDEX: 28.34 KG/M2

## 2024-09-25 DIAGNOSIS — Z78.0 POST-MENOPAUSAL: ICD-10-CM

## 2024-09-25 PROCEDURE — 77067 SCR MAMMO BI INCL CAD: CPT

## 2024-09-25 PROCEDURE — 77063 BREAST TOMOSYNTHESIS BI: CPT

## 2024-09-25 PROCEDURE — 77080 DXA BONE DENSITY AXIAL: CPT

## 2024-09-28 LAB
ALBUMIN SERPL-MCNC: 4.4 G/DL (ref 3.9–4.9)
ALP SERPL-CCNC: 85 IU/L (ref 44–121)
ALT SERPL-CCNC: 34 IU/L (ref 0–32)
AST SERPL-CCNC: 30 IU/L (ref 0–40)
BASOPHILS # BLD AUTO: 0.1 X10E3/UL (ref 0–0.2)
BASOPHILS NFR BLD AUTO: 1 %
BILIRUB SERPL-MCNC: 0.3 MG/DL (ref 0–1.2)
BUN SERPL-MCNC: 19 MG/DL (ref 8–27)
BUN/CREAT SERPL: 22 (ref 12–28)
CALCIUM SERPL-MCNC: 10 MG/DL (ref 8.7–10.3)
CHLORIDE SERPL-SCNC: 103 MMOL/L (ref 96–106)
CHOLEST SERPL-MCNC: 261 MG/DL (ref 100–199)
CO2 SERPL-SCNC: 25 MMOL/L (ref 20–29)
CREAT SERPL-MCNC: 0.85 MG/DL (ref 0.57–1)
EGFR: 76 ML/MIN/1.73
EOSINOPHIL # BLD AUTO: 0.2 X10E3/UL (ref 0–0.4)
EOSINOPHIL NFR BLD AUTO: 3 %
ERYTHROCYTE [DISTWIDTH] IN BLOOD BY AUTOMATED COUNT: 12.9 % (ref 11.7–15.4)
GLOBULIN SER-MCNC: 2.6 G/DL (ref 1.5–4.5)
GLUCOSE SERPL-MCNC: 102 MG/DL (ref 70–99)
HCT VFR BLD AUTO: 45.3 % (ref 34–46.6)
HDLC SERPL-MCNC: 54 MG/DL
HGB BLD-MCNC: 14.6 G/DL (ref 11.1–15.9)
IMM GRANULOCYTES # BLD: 0 X10E3/UL (ref 0–0.1)
IMM GRANULOCYTES NFR BLD: 0 %
LDLC SERPL CALC-MCNC: 172 MG/DL (ref 0–99)
LYMPHOCYTES # BLD AUTO: 3.2 X10E3/UL (ref 0.7–3.1)
LYMPHOCYTES NFR BLD AUTO: 44 %
MCH RBC QN AUTO: 28.2 PG (ref 26.6–33)
MCHC RBC AUTO-ENTMCNC: 32.2 G/DL (ref 31.5–35.7)
MCV RBC AUTO: 88 FL (ref 79–97)
MONOCYTES # BLD AUTO: 0.5 X10E3/UL (ref 0.1–0.9)
MONOCYTES NFR BLD AUTO: 7 %
NEUTROPHILS # BLD AUTO: 3.3 X10E3/UL (ref 1.4–7)
NEUTROPHILS NFR BLD AUTO: 45 %
PLATELET # BLD AUTO: 307 X10E3/UL (ref 150–450)
POTASSIUM SERPL-SCNC: 4.1 MMOL/L (ref 3.5–5.2)
PROT SERPL-MCNC: 7 G/DL (ref 6–8.5)
RBC # BLD AUTO: 5.18 X10E6/UL (ref 3.77–5.28)
SL AMB VLDL CHOLESTEROL CALC: 35 MG/DL (ref 5–40)
SODIUM SERPL-SCNC: 141 MMOL/L (ref 134–144)
TRIGL SERPL-MCNC: 192 MG/DL (ref 0–149)
WBC # BLD AUTO: 7.2 X10E3/UL (ref 3.4–10.8)

## 2024-09-30 ENCOUNTER — TELEPHONE (OUTPATIENT)
Dept: FAMILY MEDICINE CLINIC | Facility: CLINIC | Age: 64
End: 2024-09-30

## 2024-09-30 NOTE — TELEPHONE ENCOUNTER
Left message for patient to call the office to schedule an appointment to review bone density results.

## 2024-10-04 ENCOUNTER — OFFICE VISIT (OUTPATIENT)
Dept: FAMILY MEDICINE CLINIC | Facility: CLINIC | Age: 64
End: 2024-10-04
Payer: COMMERCIAL

## 2024-10-04 VITALS
DIASTOLIC BLOOD PRESSURE: 70 MMHG | RESPIRATION RATE: 14 BRPM | HEIGHT: 65 IN | TEMPERATURE: 97.5 F | HEART RATE: 73 BPM | WEIGHT: 169.2 LBS | OXYGEN SATURATION: 98 % | BODY MASS INDEX: 28.19 KG/M2 | SYSTOLIC BLOOD PRESSURE: 104 MMHG

## 2024-10-04 DIAGNOSIS — M81.6 LOCALIZED OSTEOPOROSIS WITHOUT CURRENT PATHOLOGICAL FRACTURE: ICD-10-CM

## 2024-10-04 DIAGNOSIS — Z00.00 ANNUAL PHYSICAL EXAM: Primary | ICD-10-CM

## 2024-10-04 DIAGNOSIS — Z12.83 SKIN CANCER SCREENING: ICD-10-CM

## 2024-10-04 PROBLEM — J06.9 UPPER RESPIRATORY TRACT INFECTION: Status: RESOLVED | Noted: 2022-05-18 | Resolved: 2024-10-04

## 2024-10-04 PROCEDURE — 99396 PREV VISIT EST AGE 40-64: CPT | Performed by: NURSE PRACTITIONER

## 2024-10-04 RX ORDER — ALENDRONATE SODIUM 70 MG/1
70 TABLET ORAL
Qty: 12 TABLET | Refills: 3 | Status: SHIPPED | OUTPATIENT
Start: 2024-10-04

## 2024-10-04 NOTE — PATIENT INSTRUCTIONS
"Patient Education     Routine physical for adults   The Basics   Written by the doctors and editors at [facility]   What is a physical? -- A physical is a routine visit, or \"check-up,\" with your doctor. You might also hear it called a \"wellness visit\" or \"preventive visit.\"  During each visit, the doctor will:   Ask about your physical and mental health   Ask about your habits, behaviors, and lifestyle   Do an exam   Give you vaccines if needed   Talk to you about any medicines you take   Give advice about your health   Answer your questions  Getting regular check-ups is an important part of taking care of your health. It can help your doctor find and treat any problems you have. But it's also important for preventing health problems.  A routine physical is different from a \"sick visit.\" A sick visit is when you see a doctor because of a health concern or problem. Since physicals are scheduled ahead of time, you can think about what you want to ask the doctor.  How often should I get a physical? -- It depends on your age and health. In general, for people age 21 years and older:   If you are younger than 50 years, you might be able to get a physical every 3 years.   If you are 50 years or older, your doctor might recommend a physical every year.  If you have an ongoing health condition, like diabetes or high blood pressure, your doctor will probably want to see you more often.  What happens during a physical? -- In general, each visit will include:   Physical exam - The doctor or nurse will check your height, weight, heart rate, and blood pressure. They will also look at your eyes and ears. They will ask about how you are feeling and whether you have any symptoms that bother you.   Medicines - It's a good idea to bring a list of all the medicines you take to each doctor visit. Your doctor will talk to you about your medicines and answer any questions. Tell them if you are having any side effects that bother you. You " "should also tell them if you are having trouble paying for any of your medicines.   Habits and behaviors - This includes:   Your diet   Your exercise habits   Whether you smoke, drink alcohol, or use drugs   Whether you are sexually active   Whether you feel safe at home  Your doctor will talk to you about things you can do to improve your health and lower your risk of health problems. They will also offer help and support. For example, if you want to quit smoking, they can give you advice and might prescribe medicines. If you want to improve your diet or get more physical activity, they can help you with this, too.   Lab tests, if needed - The tests you get will depend on your age and situation. For example, your doctor might want to check your:   Cholesterol   Blood sugar   Iron level   Vaccines - The recommended vaccines will depend on your age, health, and what vaccines you already had. Vaccines are very important because they can prevent certain serious or deadly infections.   Discussion of screening - \"Screening\" means checking for diseases or other health problems before they cause symptoms. Your doctor can recommend screening based on your age, risk, and preferences. This might include tests to check for:   Cancer, such as breast, prostate, cervical, ovarian, colorectal, prostate, lung, or skin cancer   Sexually transmitted infections, such as chlamydia and gonorrhea   Mental health conditions like depression and anxiety  Your doctor will talk to you about the different types of screening tests. They can help you decide which screenings to have. They can also explain what the results might mean.   Answering questions - The physical is a good time to ask the doctor or nurse questions about your health. If needed, they can refer you to other doctors or specialists, too.  Adults older than 65 years often need other care, too. As you get older, your doctor will talk to you about:   How to prevent falling at " home   Hearing or vision tests   Memory testing   How to take your medicines safely   Making sure that you have the help and support you need at home  All topics are updated as new evidence becomes available and our peer review process is complete.  This topic retrieved from [employer] on: May 02, 2024.  Topic 486909 Version 1.0  Release: 32.4.3 - C32.122  © 2024 UpToDate, Inc. and/or its affiliates. All rights reserved.  Consumer Information Use and Disclaimer   Disclaimer: This generalized information is a limited summary of diagnosis, treatment, and/or medication information. It is not meant to be comprehensive and should be used as a tool to help the user understand and/or assess potential diagnostic and treatment options. It does NOT include all information about conditions, treatments, medications, side effects, or risks that may apply to a specific patient. It is not intended to be medical advice or a substitute for the medical advice, diagnosis, or treatment of a health care provider based on the health care provider's examination and assessment of a patient's specific and unique circumstances. Patients must speak with a health care provider for complete information about their health, medical questions, and treatment options, including any risks or benefits regarding use of medications. This information does not endorse any treatments or medications as safe, effective, or approved for treating a specific patient. UpToDate, Inc. and its affiliates disclaim any warranty or liability relating to this information or the use thereof.The use of this information is governed by the Terms of Use, available at https://www.woltersOmegawaveuwer.com/en/know/clinical-effectiveness-terms. 2024© UpToDate, Inc. and its affiliates and/or licensors. All rights reserved.  Copyright   © 2024 UpToDate, Inc. and/or its affiliates. All rights reserved.

## 2024-10-04 NOTE — ASSESSMENT & PLAN NOTE
Patient has a history of basal cell carcinoma.  Referral made to dermatology for skin cancer screening.   Orders:    Ambulatory Referral to Dermatology; Future

## 2024-10-04 NOTE — ASSESSMENT & PLAN NOTE
Reviewed DEXA scan.  Patient started taking Caltrate D.  Patient does exercise regularly does yoga.  Discussed management of osteoporosis.  Will start Fosamax weekly.  Education provided advised to get blood work checked in 6 weeks.  Orders:    alendronate (Fosamax) 70 mg tablet; Take 1 tablet (70 mg total) by mouth every 7 days    Comprehensive metabolic panel; Future

## 2024-10-04 NOTE — ASSESSMENT & PLAN NOTE
Annual physical completed generally feels well.  Reviewed blood work.  Continue with heart healthy diet, exercise, fluid hydration.  Updated mammogram and cervical cancer screening.  Also updated colon cancer screening

## 2024-10-04 NOTE — PROGRESS NOTES
Adult Annual Physical  Name: Letty Blank      : 1960      MRN: [id number]  Encounter Provider: SUSHILA Weathers  Encounter Date: 10/4/2024   Encounter department: [facility]    Assessment & Plan  Annual physical exam  Annual physical completed generally feels well.  Reviewed blood work.  Continue with heart healthy diet, exercise, fluid hydration.  Updated mammogram and cervical cancer screening.  Also updated colon cancer screening       Localized osteoporosis without current pathological fracture  Reviewed DEXA scan.  Patient started taking Caltrate D.  Patient does exercise regularly does yoga.  Discussed management of osteoporosis.  Will start Fosamax weekly.  Education provided advised to get blood work checked in 6 weeks.  Orders:    alendronate (Fosamax) 70 mg tablet; Take 1 tablet (70 mg total) by mouth every 7 days    Comprehensive metabolic panel; Future    Skin cancer screening  Patient has a history of basal cell carcinoma.  Referral made to dermatology for skin cancer screening.   Orders:    Ambulatory Referral to Dermatology; Future    Immunizations and preventive care screenings were discussed with patient today. Appropriate education was printed on patient's after visit summary.    Counseling:  Alcohol/drug use: discussed moderation in alcohol intake, the recommendations for healthy alcohol use, and avoidance of illicit drug use.  Dental Health: discussed importance of regular tooth brushing, flossing, and dental visits.  Injury prevention: discussed safety/seat belts, safety helmets, smoke detectors, carbon monoxide detectors, and smoking near bedding or upholstery.  Sexual health: discussed sexually transmitted diseases, partner selection, use of condoms, avoidance of unintended pregnancy, and contraceptive alternatives.  Exercise: the importance of regular exercise/physical activity was discussed. Recommend exercise 3-5 times per week for at least 30 minutes.  "      Depression Screening and Follow-up Plan: Patient was screened for depression during today's encounter. They screened negative with a PHQ-2 score of 0.        History of Present Illness     Adult Annual Physical:  Patient presents for annual physical.     Diet and Physical Activity:  - Diet/Nutrition: well balanced diet.  - Exercise: 3-4 times a week on average and moderate cardiovascular exercise. yoga    Depression Screening:  - PHQ-2 Score: 0    General Health:  - Sleep: sleeps well.  - Hearing: normal hearing right ear.  - Vision: wears glasses, wears contacts and most recent eye exam < 1 year ago.  - Dental: regular dental visits and brushes teeth twice daily.    /GYN Health:    - Menopause: postmenopausal.   - History of STDs: no  - Contraception: menopause.      Advanced Care Planning:  - Has an advanced directive?: no    - Has a durable medical POA?: no    - ACP document given to patient?: no      Review of Systems   Constitutional: Negative.    HENT: Negative.     Eyes: Negative.    Respiratory: Negative.     Cardiovascular: Negative.    Gastrointestinal: Negative.    Endocrine: Negative.    Genitourinary: Negative.    Musculoskeletal: Negative.    Skin: Negative.    Allergic/Immunologic: Negative.    Neurological: Negative.    Psychiatric/Behavioral: Negative.           Objective     /70   Pulse 73   Temp 97.5 °F (36.4 °C) (Temporal)   Resp 14   Ht 5' 4.75\" (1.645 m)   Wt 76.7 kg (169 lb 3.2 oz)   SpO2 98%   BMI 28.37 kg/m²     Physical Exam  Constitutional:       General: She is not in acute distress.     Appearance: Normal appearance. She is not ill-appearing.   HENT:      Head: Normocephalic and atraumatic.      Right Ear: Tympanic membrane normal.      Left Ear: Tympanic membrane normal.      Nose: Nose normal.      Mouth/Throat:      Mouth: Mucous membranes are moist.   Eyes:      Pupils: Pupils are equal, round, and reactive to light.   Cardiovascular:      Rate and Rhythm: Normal " rate and regular rhythm.      Pulses: Normal pulses.      Heart sounds: Normal heart sounds.   Pulmonary:      Effort: Pulmonary effort is normal. No respiratory distress.      Breath sounds: Normal breath sounds.   Chest:      Chest wall: No tenderness.   Abdominal:      General: Abdomen is flat. Bowel sounds are normal. There is no distension.      Palpations: There is no mass.      Tenderness: There is no abdominal tenderness.   Musculoskeletal:         General: Normal range of motion.      Cervical back: Normal range of motion and neck supple.   Skin:     General: Skin is warm and dry.   Neurological:      General: No focal deficit present.      Mental Status: She is alert and oriented to person, place, and time.   Psychiatric:         Attention and Perception: Attention normal.         Mood and Affect: Mood normal. Affect is tearful.         Speech: Speech is delayed.         Behavior: Behavior normal. Behavior is cooperative.         Thought Content: Thought content normal.         Cognition and Memory: Cognition normal.         Judgment: Judgment normal.

## 2024-10-25 ENCOUNTER — TELEPHONE (OUTPATIENT)
Age: 64
End: 2024-10-25

## 2024-10-25 NOTE — TELEPHONE ENCOUNTER
Patient requesting additional testing done before she starts the Fosamax 70 mg. The message below from [employer]. Pls advise         Familia Walsh.   Can you order the following tests.  I would to get them done before next Tuesday.   C-Telomeptide /CTX blood test for bone resorption.      N- telopeptideNTX urine test  for bone  resorption.      PIN/N  terminal Propeptide  (bone formation marker      24 hr urine  calcium   Complete blood count comprehensive metabolic panel (CMP)  25 hydroxyvitamin D 25/(OH)D     Parathyroid Hormone  PTH intact      Thyroid stimulating hormone (TSH)  Glomerular filtration rate   Celiac disease testing tTG-IgA, IgG and IgA   totals serum IgA , IgG and IgA antibodies.   Might be a reason I bloat  too.  Luteneizing  hormone, follicle stimulating    Hotmjnen, prolactin  Female hormone ultra sensitive estradiol, estriol, total estrogen, progesterone, total testosterone, free testosterone, SHBG     This way we have a baseline and might be able to see what’s going on.      Of course if  tests are already complete then we can eliminate them or if  lab work is astronomical, let me know also if it not covered or if very high.

## 2024-11-03 PROBLEM — Z12.83 SKIN CANCER SCREENING: Status: RESOLVED | Noted: 2024-10-04 | Resolved: 2024-11-03

## 2024-11-14 ENCOUNTER — OFFICE VISIT (OUTPATIENT)
Dept: OBGYN CLINIC | Facility: MEDICAL CENTER | Age: 64
End: 2024-11-14
Payer: COMMERCIAL

## 2024-11-14 VITALS
SYSTOLIC BLOOD PRESSURE: 148 MMHG | HEIGHT: 65 IN | BODY MASS INDEX: 27.82 KG/M2 | DIASTOLIC BLOOD PRESSURE: 94 MMHG | WEIGHT: 167 LBS

## 2024-11-14 DIAGNOSIS — Z12.31 ENCOUNTER FOR SCREENING MAMMOGRAM FOR BREAST CANCER: ICD-10-CM

## 2024-11-14 DIAGNOSIS — M81.6 LOCALIZED OSTEOPOROSIS WITHOUT CURRENT PATHOLOGICAL FRACTURE: ICD-10-CM

## 2024-11-14 DIAGNOSIS — R68.82 DECREASED LIBIDO: ICD-10-CM

## 2024-11-14 DIAGNOSIS — Z01.419 ENCOUNTER FOR GYNECOLOGICAL EXAMINATION (GENERAL) (ROUTINE) WITHOUT ABNORMAL FINDINGS: Primary | ICD-10-CM

## 2024-11-14 PROBLEM — L98.9 SKIN LESIONS, GENERALIZED: Status: RESOLVED | Noted: 2018-02-20 | Resolved: 2024-11-14

## 2024-11-14 PROCEDURE — S0612 ANNUAL GYNECOLOGICAL EXAMINA: HCPCS | Performed by: CLINICAL NURSE SPECIALIST

## 2024-11-14 NOTE — PROGRESS NOTES
Name: Letty Blank      : 1960      MRN: [id number]  Encounter Provider: SUSHILA Fernández  Encounter Date: 2024   Encounter department: [facility] [place]    :  Assessment & Plan  Encounter for gynecological examination (general) (routine) without abnormal findings  Annual GYN examination completed today.   Health maintenance reviewed/updated as appropriate  Risk prevention and anticipatory guidance provided including:  Encouraged regular self breast exams and to call with changes   Age related Calcium and vitamin D intake  Dietary and lifestyle recommendations based on her age and weight. body mass index is 28.01 kg/m²..    Tobacco and alcohol use, intervention ordered if applicable.   Condom use for prevention of STI's if sexually active.       Encounter for screening mammogram for breast cancer    Orders:    Mammo screening bilateral w 3d and cad; Future    Localized osteoporosis without current pathological fracture  Dx with osteoporosis this year  PCP ordered fosamax.   Pt does not want to start med w/o more thorough w/u. Recommended consult to rheum or endo    Orders:    Ambulatory Referral to Endocrinology; Future    Decreased libido  Pt c/o decreased libido. Reports stable supportive relationship, but does have some strain due to Mother in law living with them. Has some depression and anxiety but is active and exercises regularly. No meds for anx/deprss, just adderall for ADHD. Exam unremarakble. Denies vaginal or pelvic pain during sex.    Reviewed low libido is complex to improve especially in Postmenopause.  Encouraged purposeful date nights- encouraged use of aids such as movies and books to help increase feelings of arousal. Lorelei reviewed off label use of testosterone for libido. If interested would refer to either Dr Annamaria Enriquez or Dr Mclaughlin.                           Subjective:      History of Present Illness     Letty Blank is a 64  "y.o. female. Here for Gynecologic Exam (Postmenopausal/Pap 4/1/21 -/-/Mammo 9/25/24 bi-rads 1 /Dxa 9/25/24 osteoporosis /Colonoscopy 3/14/22 recall 5 years /Paternal grandmother uterine cancer )    Pt is postmenopausal. Early 50's She denies PMB.   Occasional hot flashes  She denies any C/O abnormal vaginal discharge or irritation. Denies Urinary complaints or breast changes    Sexually active: yes; Monogamous/single partner  She is c/o low libido  She is , sleeps in separate rooms due to her snoring.  x 45 yrs. Mother in law lives with her (she is 103)    Vaginal dryness: no    She reports she feels safe at home.   Lifestyle/exercise: yoga 3-4 times per day and walks daily  Dietary calcium/vit D  intake: supplement     HEALTH MAINTENANCE SCREENINGS:     Previous pap smears and ASCCP screening guidelines have been reviewed.   Last Pap:  04/01/2021; Next due next year and then aged out.  History of abnormal pap: No,   Last mammogram:  09/25/2024  Last Colon Cancer Screening: colonoscopy: 03/14/2022 Cologuard:Not on file. Recall 5yrs  Last Dexa Scan: 9/24-osteoporosis.  PCP ordered fosamax. Pt didn't start yet.      Hereditary Cancer Screening  FH Breast/Ovarian cancer: mom w/ Breast  FH Uterine cancer: PGM   FH Colon cancer: denies    Substance Abuse Screening Completed. See hx and flowsheet.    The following portions of the patient's history were reviewed and updated as appropriate: allergies, current medications, past family history, past medical history, past social history, past surgical history, and problem list.         Objective   /94 (BP Location: Left arm, Patient Position: Sitting, Cuff Size: Standard)   Ht 5' 4.75\" (1.645 m)   Wt 75.8 kg (167 lb)   BMI 28.01 kg/m²     Physical Exam  Constitutional:       General: She is not in acute distress.     Appearance: Normal appearance.   Genitourinary:      Vulva and rectum normal.      No lesions in the vagina.      Right Labia: No rash or " lesions.     Left Labia: No lesions or rash.     No vaginal discharge, erythema, tenderness or bleeding.      No vaginal prolapse present.       Right Adnexa: not tender and no mass present.     Left Adnexa: not tender and no mass present.     No cervical motion tenderness, discharge or friability.      Uterus is not enlarged or tender.      Uterus is anteverted.      No urethral prolapse present.      Pelvic Floor: Levator muscle strength is 5/5.     Pelvic exam was performed with patient in the lithotomy position.   Breasts:     Breasts are symmetrical.      Right: No inverted nipple, mass, nipple discharge, skin change or tenderness.      Left: No inverted nipple, mass, nipple discharge, skin change or tenderness.   HENT:      Head: Normocephalic and atraumatic.   Cardiovascular:      Rate and Rhythm: Normal rate.      Heart sounds: No murmur heard.  Pulmonary:      Effort: Pulmonary effort is normal.      Breath sounds: Normal breath sounds.   Abdominal:      General: There is no distension.      Palpations: Abdomen is soft.      Tenderness: There is no abdominal tenderness.   Musculoskeletal:         General: Normal range of motion.      Cervical back: Normal range of motion.   Lymphadenopathy:      Cervical: No cervical adenopathy.   Neurological:      Mental Status: She is alert and oriented to person, place, and time.   Skin:     General: Skin is warm and dry.   Psychiatric:         Mood and Affect: Mood normal.         Behavior: Behavior normal.   Vitals reviewed.

## 2024-11-14 NOTE — ASSESSMENT & PLAN NOTE
Dx with osteoporosis this year  PCP ordered fosamax.   Pt does not want to start med w/o more thorough w/u. Recommended consult to rheum or endo    Orders:    Ambulatory Referral to Endocrinology; Future

## 2024-12-03 ENCOUNTER — TELEPHONE (OUTPATIENT)
Dept: OTHER | Facility: OTHER | Age: 64
End: 2024-12-03

## 2024-12-04 ENCOUNTER — OFFICE VISIT (OUTPATIENT)
Dept: FAMILY MEDICINE CLINIC | Facility: CLINIC | Age: 64
End: 2024-12-04
Payer: COMMERCIAL

## 2024-12-04 ENCOUNTER — APPOINTMENT (OUTPATIENT)
Dept: RADIOLOGY | Facility: CLINIC | Age: 64
End: 2024-12-04
Payer: COMMERCIAL

## 2024-12-04 VITALS
WEIGHT: 167 LBS | TEMPERATURE: 97.6 F | HEART RATE: 73 BPM | RESPIRATION RATE: 16 BRPM | HEIGHT: 65 IN | OXYGEN SATURATION: 96 % | SYSTOLIC BLOOD PRESSURE: 130 MMHG | BODY MASS INDEX: 27.82 KG/M2 | DIASTOLIC BLOOD PRESSURE: 80 MMHG

## 2024-12-04 DIAGNOSIS — Z23 ENCOUNTER FOR IMMUNIZATION: ICD-10-CM

## 2024-12-04 DIAGNOSIS — M25.512 ACUTE PAIN OF LEFT SHOULDER: ICD-10-CM

## 2024-12-04 DIAGNOSIS — M25.532 WRIST PAIN, ACUTE, LEFT: ICD-10-CM

## 2024-12-04 DIAGNOSIS — M25.522 LEFT ELBOW PAIN: ICD-10-CM

## 2024-12-04 DIAGNOSIS — M25.512 ACUTE PAIN OF LEFT SHOULDER: Primary | ICD-10-CM

## 2024-12-04 PROCEDURE — 90471 IMMUNIZATION ADMIN: CPT

## 2024-12-04 PROCEDURE — 73080 X-RAY EXAM OF ELBOW: CPT

## 2024-12-04 PROCEDURE — 73100 X-RAY EXAM OF WRIST: CPT

## 2024-12-04 PROCEDURE — 73030 X-RAY EXAM OF SHOULDER: CPT

## 2024-12-04 PROCEDURE — 90673 RIV3 VACCINE NO PRESERV IM: CPT

## 2024-12-04 PROCEDURE — 99213 OFFICE O/P EST LOW 20 MIN: CPT | Performed by: NURSE PRACTITIONER

## 2024-12-04 RX ORDER — PREDNISONE 20 MG/1
20 TABLET ORAL DAILY
Qty: 5 TABLET | Refills: 0 | Status: SHIPPED | OUTPATIENT
Start: 2024-12-04 | End: 2024-12-10

## 2024-12-04 NOTE — PROGRESS NOTES
"Name: Letty Blank      : 1960      MRN: [id number]  Encounter Provider: SUSHILA Weathers  Encounter Date: 2024   Encounter department: [facility]  :  Assessment & Plan  Acute pain of left shoulder  Patient is acute pain of left shoulder, arm, wrist.  Denies any acute injury but patient is a  part time does hold heavy trays.  X-rays ordered.  Prednisone therapy.  Physical therapy ordered.  May use heat, muscle rub.  Orders:    Ambulatory Referral to Physical Therapy; Future    predniSONE 20 mg tablet; Take 1 tablet (20 mg total) by mouth daily    Left elbow pain    Orders:    XR elbow 3+ vw left; Future    Ambulatory Referral to Physical Therapy; Future    predniSONE 20 mg tablet; Take 1 tablet (20 mg total) by mouth daily    Wrist pain, acute, left    Orders:    XR wrist 2 vw left; Future    Encounter for immunization    Orders:    Flublok (recombinant) 0.5 mL IM           History of Present Illness     Patient is here with complaints of left shoulder, arm, wrist pain.  Patient is a  part-time, holds heavy trays.  States that symptoms do sometimes resolves but lately it has not.  Denies any acute injury      Review of Systems   Constitutional: Negative.    HENT: Negative.     Eyes: Negative.    Respiratory: Negative.     Cardiovascular: Negative.    Gastrointestinal: Negative.    Endocrine: Negative.    Genitourinary: Negative.    Musculoskeletal:  Positive for arthralgias (Left shoulder, arm, wrist).   Skin: Negative.    Allergic/Immunologic: Negative.    Neurological: Negative.    Psychiatric/Behavioral: Negative.            Objective   /80 (BP Location: Left arm, Patient Position: Sitting, Cuff Size: Extra-Large)   Pulse 73   Temp 97.6 °F (36.4 °C) (Temporal)   Resp 16   Ht 5' 5.35\" (1.66 m)   Wt 75.8 kg (167 lb)   SpO2 96%   BMI 27.49 kg/m²      Physical Exam  Constitutional:       General: She is not in acute distress.     Appearance: Normal " appearance. She is obese. She is not ill-appearing.   HENT:      Head: Normocephalic and atraumatic.   Cardiovascular:      Rate and Rhythm: Normal rate and regular rhythm.      Pulses: Normal pulses.   Pulmonary:      Effort: Pulmonary effort is normal. No respiratory distress.      Breath sounds: Normal breath sounds.   Chest:      Chest wall: No tenderness.   Abdominal:      General: There is no distension.      Palpations: There is no mass.      Tenderness: There is no abdominal tenderness.   Musculoskeletal:         General: Tenderness present. No swelling. Normal range of motion.      Cervical back: Normal range of motion and neck supple.      Comments: Negative Tinel   Skin:     General: Skin is warm and dry.   Neurological:      General: No focal deficit present.      Mental Status: She is alert and oriented to person, place, and time.   Psychiatric:         Mood and Affect: Mood normal.         Behavior: Behavior normal.         Thought Content: Thought content normal.         Judgment: Judgment normal.

## 2024-12-04 NOTE — TELEPHONE ENCOUNTER
Patient called to verify and conform that she will attending to her appointment at 11am and not 7am. Appointment was verified and appointment time is at 11:20am patient was informed.

## 2024-12-05 ENCOUNTER — EVALUATION (OUTPATIENT)
Dept: PHYSICAL THERAPY | Facility: CLINIC | Age: 64
End: 2024-12-05
Payer: COMMERCIAL

## 2024-12-05 DIAGNOSIS — M25.522 LEFT ELBOW PAIN: ICD-10-CM

## 2024-12-05 DIAGNOSIS — M25.512 ACUTE PAIN OF LEFT SHOULDER: ICD-10-CM

## 2024-12-05 PROCEDURE — 97161 PT EVAL LOW COMPLEX 20 MIN: CPT

## 2024-12-05 PROCEDURE — 97110 THERAPEUTIC EXERCISES: CPT

## 2024-12-05 NOTE — PROGRESS NOTES
PT Evaluation     Today's date: 2024  Patient name: Letty Blank  : 1960  MRN: [id number]  Referring provider: Belen Snow CRNP  Dx:   Encounter Diagnosis     ICD-10-CM    1. Acute pain of left shoulder  M25.512 Ambulatory Referral to Physical Therapy      2. Left elbow pain  M25.522 Ambulatory Referral to Physical Therapy          Start Time:   Stop Time:   Total time in clinic (min): 35 minutes    Assessment  Impairments: abnormal or restricted ROM, activity intolerance, impaired physical strength and pain with function    Assessment details: Patient is a 64 y.o. female who presents to physical therapy with c/o left arm pain consistent with lateral epicondylitis. Patient presents to evaluation with pain, decreased range of motion, decreased strength, and decreased tolerance to activity. Patient demonstrates good tolerance to treatment and was provided with a written copy of their initial home exercise program focusing on wrist stretching and was encouraged to perform daily per tolerance. I discussed risks, benefits, and alternatives to treatment, and answered all patient questions to patient satisfaction. Patient is an appropriate candidate for skilled PT and would benefit from skilled PT services to address the aforementioned impairments, achieve goals, maximize function, and improve quality of life. Pt is in agreement with this plan.    Patient Education: activity modifications as needed, pacing of activities, importance of HEP compliance, PT prognosis/POC      Goals  ST weeks  Pt will demonstrate good understanding and compliance with HEP  Pt will decrease pain to 5/10    Pt will demonstrate improved postural awareness and ability to self-correct without reliance on external cues     LT weeks  Pt will improve FOTO score to > or = to expected score to indicate improved functional abilities   Pt will decrease pain to 0-1/10   Pt will increase wrist strength to 5/5 for improved  tolerance/independence with ADLs  Pt will be able to work without irritation of symptoms     Plan  Patient would benefit from: skilled physical therapy and PT eval  Planned modality interventions: cryotherapy, thermotherapy: hydrocollator packs and TENS    Planned therapy interventions: manual therapy, home exercise program, graded motor, graded exercise, graded activity, functional ROM exercises, flexibility, strengthening, stretching, therapeutic activities, therapeutic exercise and therapeutic training    Frequency: 1-2x week  Plan of Care beginning date: 2024  Plan of Care expiration date: 2025    Subjective Evaluation    History of Present Illness  Mechanism of injury: Pt is a 63 y/o who presents to therapy with c/o of left arm pain. 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 She has been getting this pain for a year, but it used to go away. Over the past few months it does not go away. The pain has been constant since November. She has been having difficulty sleeping on her side and lifting trays at work. The pain only starts when she is working.   Pain  Current pain ratin  At worst pain ratin  Quality: dull ache      Objective     General Comments:      Shoulder Comments   L AROM: WNL    L shoulder strength:   flex- 5/5 abd- 4+/5 ER- 5/5 IR- 5/5    Sensation: intact/symmetrical    TTP: wrist extensors    Hawkin's-[place]: (-)  Empty-Can: (-)     L elbow AROM: WNL    L elbow strength:  5/5    L wrist strength:  4/5     Cozen's:(-)  Maudsley's: (+)    Phalen's: (-)              POC expires Unit limit Auth Expiration date PT/OT/ST + Visit Limit?   25 4 pending 20                           Visit/Unit Tracking  AUTH Status:  Date               pending Used                Remaining                    Diagnosis: lateral epicondylitis   Precautions:    POC Expires: 25   Re-evaluation Date: 25    FOTO Scores/Date: Goal -;    Visit Count 1/10        Manuals 12/5                               Ther Ex 12/5       Wrist stretches HEP                                                                       Neuro Re-Ed                                                               Ther Act                                                                                 Modalities

## 2024-12-09 ENCOUNTER — OFFICE VISIT (OUTPATIENT)
Dept: PHYSICAL THERAPY | Facility: CLINIC | Age: 64
End: 2024-12-09
Payer: COMMERCIAL

## 2024-12-09 DIAGNOSIS — M25.522 LEFT ELBOW PAIN: Primary | ICD-10-CM

## 2024-12-09 DIAGNOSIS — M25.512 ACUTE PAIN OF LEFT SHOULDER: ICD-10-CM

## 2024-12-09 PROCEDURE — 97110 THERAPEUTIC EXERCISES: CPT

## 2024-12-09 PROCEDURE — 97140 MANUAL THERAPY 1/> REGIONS: CPT

## 2024-12-09 NOTE — PROGRESS NOTES
"Daily Note     Today's date: 2024  Patient name: Letty Blank  : 1960  MRN: [id number]  Referring provider: Belen Snow CRNP  Dx:   Encounter Diagnosis     ICD-10-CM    1. Left elbow pain  M25.522       2. Acute pain of left shoulder  M25.512           Start Time: 1800          Subjective: Pt reports some improvements since starting HEP.       Objective: See treatment diary below      Assessment: Initiated POC as shown below for wrist/elbow strengthening and stretching. IASTM performed to wrist extensors to help with symptom modulation. Minimal irritation of symptoms with therabar exercises which went away with increased reps. Reviewed with patient proper placement of brace. Tolerated treatment well. Patient would benefit from continued PT      Plan: Continue per plan of care.        POC expires Unit limit Auth Expiration date PT/OT/ST + Visit Limit?   25 4 25                           Visit/Unit Tracking  AUTH Status:  Date 20 authorized  Used 2               Remaining  18                  Diagnosis: lateral epicondylitis   Precautions:    POC Expires: 25   Re-evaluation Date: 25    FOTO Scores/Date: Goal -;    Visit Count 1/10 2/10      Manuals       IASTM  AM                      Ther Ex       Wrist stretches HEP 3x30\"      UBE  L1 3/3       Wrist flex/ext  1# 10x, 2# 10x        Smiles/frowns  Red therabar 20x       Therabar twists  20x       Bicep curl 3 way  20x ea 2#                               Neuro Re-Ed                                                               Ther Act                                                                                 Modalities                                            "

## 2024-12-10 ENCOUNTER — OFFICE VISIT (OUTPATIENT)
Age: 64
End: 2024-12-10
Payer: COMMERCIAL

## 2024-12-10 VITALS
SYSTOLIC BLOOD PRESSURE: 100 MMHG | TEMPERATURE: 98.2 F | HEIGHT: 65 IN | WEIGHT: 170 LBS | BODY MASS INDEX: 28.32 KG/M2 | OXYGEN SATURATION: 96 % | HEART RATE: 72 BPM | DIASTOLIC BLOOD PRESSURE: 65 MMHG

## 2024-12-10 DIAGNOSIS — D22.9 MULTIPLE NEVI: ICD-10-CM

## 2024-12-10 DIAGNOSIS — L82.1 SEBORRHEIC KERATOSIS: ICD-10-CM

## 2024-12-10 DIAGNOSIS — Z12.83 SKIN CANCER SCREENING: ICD-10-CM

## 2024-12-10 DIAGNOSIS — Z85.828 HISTORY OF SKIN CANCER: ICD-10-CM

## 2024-12-10 DIAGNOSIS — Z13.89 SCREENING FOR SKIN CONDITION: Primary | ICD-10-CM

## 2024-12-10 DIAGNOSIS — D18.01 CHERRY ANGIOMA: ICD-10-CM

## 2024-12-10 PROCEDURE — 99203 OFFICE O/P NEW LOW 30 MIN: CPT | Performed by: DERMATOLOGY

## 2024-12-10 NOTE — PROGRESS NOTES
"[facility] Dermatology Clinic Note     Patient Name: Letty Blank  Encounter Date: December 10, 2024     Have you been cared for by a [facility] Dermatologist in the last 3 years and, if so, which description applies to you?    Yes.  I have been here within the last 3 years, and my medical history has NOT changed since that time.  I am FEMALE/of child-bearing potential.    REVIEW OF SYSTEMS:  Have you recently had or currently have any of the following? No changes in my recent health.   PAST MEDICAL HISTORY:  Have you personally ever had or currently have any of the following?  If \"YES,\" then please provide more detail. No changes in my medical history.   HISTORY OF IMMUNOSUPPRESSION: Do you have a history of any of the following:  Systemic Immunosuppression such as Diabetes, Biologic or Immunotherapy, Chemotherapy, Organ Transplantation, Bone Marrow Transplantation or Prednisone?  No     Answering \"YES\" requires the addition of the dotphrase \"IMMUNOSUPPRESSED\" as the first diagnosis of the patient's visit.   FAMILY HISTORY:  Any \"first degree relatives\" (parent, brother, sister, or child) with the following?    No changes in my family's known health.   PATIENT EXPERIENCE:    Do you want the Dermatologist to perform a COMPLETE skin exam today including a clinical examination under the \"bra and underwear\" areas?  Yes  If necessary, do we have your permission to call and leave a detailed message on your Preferred Phone number that includes your specific medical information?  Yes      Allergies   Allergen Reactions    Penicillins     Pollen Extract       Current Outpatient Medications:     amphetamine-dextroamphetamine (ADDERALL, 10MG,) 10 mg tablet, Take 1 tablet (10 mg total) by mouth 2 (two) times a day Max Daily Amount: 20 mg, Disp: 60 tablet, Rfl: 0    fluticasone (FLONASE) 50 mcg/act nasal spray, 1 spray into each nostril daily, Disp: 11.1 mL, Rfl: 3    Lumigan 0.01 % ophthalmic drops, , Disp: , Rfl:     " "Multiple Vitamin tablet, Take by mouth, Disp: , Rfl:     ondansetron (ZOFRAN-ODT) 4 mg disintegrating tablet, Take 2 tablets (8 mg total) by mouth every 8 (eight) hours as needed for nausea or vomiting, Disp: 30 tablet, Rfl: 0    Thiamine HCl (vitamin B-1) 250 MG tablet, Take 250 mg by mouth daily (Patient not taking: Reported on 12/10/2024), Disp: , Rfl:           Whom besides the patient is providing clinical information about today's encounter?   NO ADDITIONAL HISTORIAN (patient alone provided history)    Physical Exam and Assessment/Plan by Diagnosis:    Chief complaint: Patient is a 63 y/o female present for a routine skin exam with history of non-melanoma skin cancer and has some spots of concern on the chest and back. Also has a red blotch on the Right Upper Arm from flu vaccine.    HISTORY OF BASAL CELL CARCINOMA    Physical Exam:  Anatomic Location Affected:  Right Upper Back - 2018  Chest Wall - 2018  Morphological Description of scar:  well healed  Suspected Recurrence: No  Pertinent Positives:  Pertinent Negatives:    Additional History of Present Condition:  History of basal cell carcinoma with no sign of recurrence    Assessment and Plan:  Based on a thorough discussion of this condition and the management approach to it (including a comprehensive discussion of the known risks, side effects and potential benefits of treatment), the patient (family) agrees to implement the following specific plan:  Monitor for changes  When outside we recommend using a wide brim hat, sunglasses, long sleeve and pants, sunscreen with SPF 30+ with reapplication every 2 hours, or SPF specific clothing     MELANOCYTIC NEVI (\"Moles\")    Physical Exam:  Anatomic Location Affected: Mostly on sun-exposed areas of the trunk and extremities  Morphological Description:  Scattered, 1-4mm round to ovoid, symmetrical-appearing, even bordered, skin colored to dark brown macules/papules, mostly in sun-exposed areas  Pertinent " "Positives:  Pertinent Negatives:    Additional History of Present Condition:  present on exam    Assessment and Plan:  Based on a thorough discussion of this condition and the management approach to it (including a comprehensive discussion of the known risks, side effects and potential benefits of treatment), the patient (family) agrees to implement the following specific plan:  Provided handout with information regarding the ABCDE's of moles   Recommend routine skin exams every year   Sun avoidance, protective clothing (known as UPF clothing), and the use of at least SPF 30 sunscreens is advised. Sunscreen should be reapplied every two hours when outside.     SEBORRHEIC KERATOSIS; NON-INFLAMED    Physical Exam:  Anatomic Location Affected:  scattered across trunk, extremities, face  Morphological Description:  Flat and raised, waxy, smooth to warty textured, yellow to brownish-grey to dark brown to blackish, discrete, \"stuck-on\" appearing papules.  Pertinent Positives:  Pertinent Negatives:    Additional History of Present Condition:  Patient reports new bumps on the skin.  Denies itch, burn, pain, bleeding or ulceration.  Present constantly; nothing seems to make it worse or better.  No prior treatment.      Assessment and Plan:  Based on a thorough discussion of this condition and the management approach to it (including a comprehensive discussion of the known risks, side effects and potential benefits of treatment), the patient (family) agrees to implement the following specific plan:  Reassured benign    ANGIOMA (\"CHERRY ANGIOMA\")    Physical Exam:  Anatomic Location: scattered across sun exposed areas of the trunk and extremities   Morphologic Description: Firm red to reddish-blue discrete papules  Pertinent Positives:  Pertinent Negatives:    Additional History of Present Condition:  Present on exam.     Assessment and Plan:  Reassured benign    Scribe Attestation      I,:  Sary Sorenson MA am acting as a " scribe while in the presence of the attending physician.:       I,:  Maximilian Alvarez MD personally performed the services described in this documentation    as scribed in my presence.:

## 2024-12-10 NOTE — PATIENT INSTRUCTIONS
ACTINIC KERATOSIS    Actinic keratoses are very common on sites repeatedly exposed to the sun, especially the backs of the hands and the face, most often affecting the ears, nose, cheeks, upper lip, vermilion of the lower lip, temples, forehead and balding scalp. In severely chronically sun-damaged individuals, they may also be found on the upper trunk, upper and lower limbs, and dorsum of feet.    We discussed the theoretical premalignant (“pre-cancerous”) nature and etiology of these growths.  We discussed the prevailing notion that actinic keratoses are a reflection of abnormal skin cell development due to DNA damage by short wavelength UVB.  They are more likely to appear if the immune function is poor, due to aging, recent sun exposure, predisposing disease or certain drugs.    We discussed that the main concern is that actinic keratoses may predispose to squamous cell carcinoma. It is rare for a solitary actinic keratosis to evolve to squamous cell carcinoma (SCC), but the risk of SCC occurring at some stage in a patient with more than 10 actinic keratoses is thought to be about 10 to 15%. A tender, thickened, ulcerated or enlarging actinic keratosis is suspicious of SCC.    Actinic keratoses may be prevented by strict sun protection. If already present, keratoses may improve with a very high sun protection factor (50+) broad-spectrum sunscreen applied at least daily to affected areas, year-round.  We recommend that UPF-rated clothing and hats and sunglasses be worn whenever possible and that a sunscreen-moisturizer combination product such as Neutrogena Daily Defense be applied at least three times a day.    We performed a thorough discussion of treatment options and specific risk/benefits/alternatives including but not limited to medical “field” treatment with medications such as the following:    Topical “field area” medications such as 5-fluorouracil or Aldara (specifically, the trouble with long-term  compliance, blistering and local skin reaction versus the convenience of at-home therapy and that field therapy “gets what is not yet seen”).    Cryotherapy (specifically, local pain, scarring, dyspigmentation, blistering, possible superinfection, and treats “only what we see” versus directed treatment today).    Photodynamic therapy (specifically, local pain, scarring, dyspigmentation, blistering, possible superinfection, need to schedule for a later date, and time spent in the office versus field therapy that “gets what is not yet seen”).    BASAL CELL CARCINOMA    What is basal cell carcinoma?  Basal cell carcinoma (BCC) is a common, locally invasive, keratinocytic, or non-melanoma, skin cancer. It is also known as rodent ulcer and basalioma. Patients with BCC often develop multiple primary tumours over time.    Who gets basal cell carcinoma?  Risk factors for BCC include:  Age and sex: BCCs are particularly prevalent in elderly males. However, they also affect females and younger adults   Previous BCC or other form of skin cancer (squamous cell carcinoma, melanoma)   Sun damage (photoaging, actinic keratoses)   Repeated prior episodes of sunburn   Fair skin, blue eyes and blond or red hair--note; BCC can also affect darker skin types   Previous cutaneous injury, thermal burn, disease (eg cutaneous lupus, sebaceous naevus)   Inherited syndromes: BCC is a particular problem for families with basal cell naevus syndrome (Gorlin syndrome), Mayid-Rgmbé-Bepnaymx syndrome, Rombo syndrome, Oley syndrome and xeroderma pigmentosum   Other risk factors include ionising radiation, exposure to arsenic, and immune suppression due to disease or medicines    What causes basal cell carcinoma?  The cause of BCC is multifactorial.  Most often, there are DNA mutations in the patched (PTCH) tumour suppressor gene, part of hedgehog signaling pathway   These may be triggered by exposure to ultraviolet radiation   Various spontaneous  and inherited gene defects predispose to BCC    What are the clinical features of basal cell carcinoma?  BCC is a locally invasive skin tumour. The main characteristics are:  Slowly growing plaque or nodule   Skin coloured, pink or pigmented   Varies in size from a few millimetres to several centimetres in diameter   Spontaneous bleeding or ulceration  BCC is very rarely a threat to life. A tiny proportion of BCCs grow rapidly, invade deeply, and/or metastasise to local lymph nodes.    Types of basal cell carcinoma  There are several distinct clinical types of BCC, and over 20 histological growth patterns of BCC.  Nodular BCC  Most common type of facial BCC   Shiny or pearly nodule with a smooth surface   May have central depression or ulceration, so its edges appear rolled   Blood vessels cross its surface   Cystic variant is soft, with jelly-like contents   Micronodular, microcystic and infiltrative types are potentially aggressive subtypes   Also known as nodulocystic carcinoma  Superficial BCC  Most common type in younger adults   Most common type on upper trunk and shoulders   Slightly scaly, irregular plaque   Thin, translucent rolled border   Multiple microerosions  Morphoeaform BCC  Usually found in mid-facial sites   Waxy, scar-like plaque with indistinct borders   Wide and deep subclinical extension   May infiltrate cutaneous nerves (perineural spread)   Also known as morpheic, morphoeiform or sclerosing BCC  Basosquamous carcinoma  Mixed basal cell carcinoma (BCC) and squamous cell carcinoma (SCC)   Infiltrative growth pattern   Potentially more aggressive than other forms of BCC   Also known as basisquamous carcinoma and mixed basal-squamous cell carcinoma       Complications of basal cell carcinoma    Recurrent BCC  Recurrence of BCC after initial treatment is not uncommon. Characteristics of recurrent BCC often include:  Incomplete excision or narrow margins at primary excision   Morphoeic,  micronodular, and infiltrative subtypes   Location on head and neck    Advanced BCC  Advanced BCCs are large, often neglected tumours.  They may be several centimetres in diameter   They may be deeply infiltrating into tissues below the skin   They are difficult or impossible to treat surgically    Metastatic BCC  Very rare   Primary tumour is often large, neglected or recurrent, located on head and neck, with aggressive subtype   May have had multiple prior treatments   May arise in site exposed to ionising radiation   Can be fatal    How is basal cell carcinoma diagnosed?  BCC is diagnosed clinically by the presence of a slowly enlarging skin lesion with typical appearance. The diagnosis and  by a diagnostic biopsy or following excision.  Some typical superficial BCCs on trunk and limbs are clinically diagnosed and have non-surgical treatment without histology.    What is the treatment for primary basal cell carcinoma?  The treatment for a BCC depends on its type, size and location, the number to be treated, patient factors, and the preference or expertise of the doctor. Most BCCs are treated surgically. Long-term follow-up is recommended to check for new lesions and recurrence; the latter may be unnecessary if histology has reported wide clear margins.    Excision biopsy  Excision means the lesion is cut out and the skin stitched up.  Most appropriate treatment for nodular, infiltrative and morphoeic BCCs   Should include 3 to 5 mm margin of normal skin around the tumour   Very large lesions may require flap or skin graft to repair the defect   Pathologist will report deep and lateral margins   Further surgery is recommended for lesions that are incompletely excised    Mohs micrographically controlled excision  Mohs micrographically controlled surgery involves examining carefully marked excised tissue under the microscope, layer by layer, to ensure complete excision.  Very high cure rates achieved by trained Mohs  surgeons   Used in high-risk areas of the face around eyes, lips and nose   Suitable for ill-defined, morphoeic, infiltrative and recurrent subtypes   Large defects are repaired by flap or skin graft    Superficial skin surgery  Superficial skin surgery comprises shave, curettage, and electrocautery. It is a rapid technique using local anaesthesia and does not require sutures.  Suitable for small, well-defined nodular or superficial BCCs   Lesions are usually located on trunk or limbs   Wound is left open to heal by secondary intention   Moist wound dressings lead to healing within a few weeks   Eventual scar quality variable    Cryotherapy  Cryotherapy is the treatment of a superficial skin lesion by freezing it, usually with liquid nitrogen.  Suitable for small superficial BCCs on covered areas of trunk and limbs   Best avoided for BCCs on head and neck, and distal to knees   Double freeze-thaw technique   Results in a blister that crusts over and heals within several weeks.   Leaves permanent white stevie    Photodynamic therapy  Photodynamic therapy (PDT) refers to a technique in which BCC is treated with a photosensitising chemical, and exposed to light several hours later.  Topical photosensitisers include aminolevulinic acid lotion and methyl aminolevulinate cream   Suitable for low-risk small, superficial BCCs   Best avoided if tumour in site at high risk of recurrence   Results in inflammatory reaction, maximal 3-4 days after procedure   Treatment repeated 7 days after initial treatment   Excellent cosmetic results    Imiquimod cream  Imiquimod is an immune response modifier.  Best used for superficial BCCs less than 2 cm diameter   Applied three to five times each week, for 6-16 weeks   Results in a variable inflammatory reaction, maximal at three weeks   Minimal scarring is usual    Fluorouracil cream  5-Fluorouracil cream is a topical cytotoxic agent.  Used to treat small superficial basal cell carcinomas    Requires prolonged course, eg twice daily for 6-12 weeks   Causes inflammatory reaction   Has high recurrence rates    Radiotherapy  Radiotherapy or X-ray treatment can be used to treat primary BCCs or as adjunctive treatment if margins are incomplete.  Mainly used if surgery is not suitable   Best avoided in young patients and in genetic conditions predisposing to skin cancer   Best cosmetic results achieved using multiple fractions   Typically, patient attends once-weekly for several weeks   Causes inflammatory reaction followed by scar   Risk of radiodermatitis, late recurrence, and new tumours    What is the treatment for advanced or metastatic basal cell carcinoma?  Locally advanced primary, recurrent or metastatic BCC requires multidisciplinary consultation. Often a combination of treatments is used.  Surgery   Radiotherapy   Targeted therapy  Targeted therapy refers to the hedgehog signalling pathway inhibitors, vismodegib and sonidegib. These drugs have some important risks and side effects.    How can basal cell carcinoma be prevented?  The most important way to prevent BCC is to avoid sunburn. This is especially important in childhood and early life. Fair skinned individuals and those with a personal or family history of BCC should protect their skin from sun exposure daily, year-round and lifelong.  Stay indoors or under the shade in the middle of the day   Wear covering clothing   Apply high protection factor SPF50+ broad-spectrum sunscreens generously to exposed skin if outdoors   Avoid indoor tanning (sun beds, solaria)  Oral nicotinamide (vitamin B3) in a dose of 500 mg twice daily may reduce the number and severity of BCCs.    What is the outlook for basal cell carcinoma?  Most BCCs are cured by treatment. Cure is most likely if treatment is undertaken when the lesion is small.  About 50% of people with BCC develop a second one within 3 years of the first. They are also at increased risk of other  skin cancers, especially melanoma. Regular self-skin examinations and long-term annual skin checks by an experienced health professional are recommended.    SQUAMOUS CELL CARCINOMA    What is cutaneous squamous cell carcinoma?  Cutaneous squamous cell carcinoma (SCC) is a common type of keratinocyte or non-melanoma skin cancer. It is derived from cells within the epidermis that make keratin -- the horny protein that makes up skin, hair and nails.  Cutaneous SCC is an invasive disease, referring to cancer cells that have grown beyond the epidermis. SCC can sometimes metastasise and may prove fatal.  Intraepidermal carcinoma (cutaneous SCC in situ) and mucosal SCC are considered elsewhere.    Who gets cutaneous squamous cell carcinoma?  Risk factors for cutaneous SCC include:  Age and sex: SCCs are particularly prevalent in elderly males. However, they also affect females and younger adults.   Previous SCC or another form of skin cancer (basal cell carcinoma, melanoma) are a strong predictor for further skin cancers.   Actinic keratoses   Outdoor occupation or recreation   Smoking   Fair skin, blue eyes and blond or red hair   Previous cutaneous injury, thermal burn, disease (eg cutaneous lupus, epidermolysis bullosa, leg ulcer)   Inherited syndromes: SCC is a particular problem for families with xeroderma pigmentosum and albinism   Other risk factors include ionising radiation, exposure to arsenic, and immune suppression due to disease (eg chronic lymphocytic leukaemia) or medicines. Organ transplant recipients have a massively increased risk of developing SCC.    What causes cutaneous squamous cell carcinoma?  More than 90% of cases of SCC are associated with numerous DNA mutations in multiple somatic genes. Mutations in the p53 tumour suppressor gene are caused by exposure to ultraviolet radiation (UV), especially UVB (known as signature 7). Other signature mutations relate to cigarette smoking, ageing and immune  suppression (eg, to drugs such as azathioprine). Mutations in signalling pathways affect the epidermal growth factor receptor, NEIDA, Fyn, and [id number] signalling.   Beta-genus human papillomaviruses (wart virus) are thought to play a role in SCC arising in immune-suppressed populations. ?-HPV and HPV subtypes 5, 8, 17, 20, 24, and 38 have also been associated with an increased risk of cutaneous SCC in immunocompetent individuals.     What are the clinical features of cutaneous squamous cell carcinoma?  Cutaneous SCCs present as enlarging scaly or crusted lumps. They usually arise within pre-existing actinic keratosis or intraepidermal carcinoma.  They grow over weeks to months   They may ulcerate   They are often tender or painful   Located on sun-exposed sites, particularly the face, lips, ears, hands, forearms and lower legs   Size varies from a few millimetres to several centimetres in diameter.    Types of cutaneous squamous cell carcinoma  Distinct clinical types of invasive cutaneous SCC include:  Cutaneous horn -- the horn is due to excessive production of keratin   Keratoacanthoma (KA) -- a rapidly growing keratinising nodule that may resolve without treatment   Carcinoma cuniculatum (‘verrucous carcinoma’), a slow-growing, warty tumour on the sole of the foot.   Multiple eruptive SCC/KA-like lesions arising in syndromes, such as multiple self-healing squamous epitheliomas of Vaca-Smith and Grzybowski syndrome  The pathologist may classify a tumour as well differentiated, moderately well differentiated, poorly differentiated or anaplastic cutaneous SCC. There are other variants.    Classification of squamous cell carcinoma by risk  Cutaneous SCC is classified as low-risk or high-risk, depending on the chance of tumour recurrence and metastasis. Characteristics of high-risk SCC include:  High-risk cutaneous squamous cell carcinoma has the following characteristics:  Diameter greater than or equal to 2 cm    Location on the ear, vermilion of the lip, central face, hands, feet, genitalia   Arising in elderly or immune suppressed patient   Histological thickness greater than 2 mm, poorly differentiated histology, or with the invasion of the subcutaneous tissue, nerves and blood vessels  Metastatic SCC is found in regional lymph nodes (80%), lungs, liver, brain, bones and skin.    Staging cutaneous squamous cell carcinoma  In 2011, the American Joint Committee on Cancer (AJCC) published a new staging systemic for cutaneous SCC for the 7th Edition of the AJCC manual. This evaluates the dimensions of the original primary tumour (T) and its metastases to lymph nodes (N).    Tumour staging for cutaneous SCC  TX: Th Primary tumour cannot be assessed  T0: No evidence of a primary tumour  Tis: Carcinoma in situ  T1: Tumour <= 2cm without high-risk features  T2: Tumour >= 2cm; or; Tumour <= 2 cm with high-risk features  T3: Tumour with the invasion of maxilla, mandible, orbit or temporal bone  T4: Tumour with the invasion of axial or appendicular skeleton or perineural invasion of skull base    Kevan staging for cutaneous SCC  NX: Regional lymph nodes cannot be assessed  N0: No regional lymph node metastasis  N1: Metastasis in one local lymph node <= 3cm  N2: Metastasis in one local lymph node >= 3cm; or; Metastasis in >1 local lymph node <= 6cm  N3: Metastasis in lymph node >= 6cm    How is squamous cell carcinoma diagnosed?  Diagnosis of cutaneous SCC is based on clinical features. The diagnosis and histological subtype are confirmed pathologically by diagnostic biopsy or following excision. See squamous cell carcinoma - pathology.  Patients with high-risk SCC may also undergo staging investigations to determine whether it has spread to lymph nodes or elsewhere. These may include:  Imaging using ultrasound scan, X-rays, CT scans, MRI scans   Lymph node or other tissue biopsies    What is the treatment for cutaneous squamous  cell carcinoma?  Cutaneous SCC is nearly always treated surgically. Most cases are excised with a 3-10 mm margin of normal tissue around a visible tumour. A flap or skin graft may be needed to repair the defect.  Other methods of removal include:  Shave, curettage, and electrocautery for low-risk tumours on trunk and limbs   Aggressive cryotherapy for very small, thin, low-risk tumours   Mohs micrographic surgery for large facial lesions with indistinct margins or recurrent tumours   Radiotherapy for an inoperable tumour, patients unsuitable for surgery, or as adjuvant    What is the treatment for advanced or metastatic squamous cell carcinoma?  Locally advanced primary, recurrent or metastatic SCC requires multidisciplinary consultation. Often a combination of treatments is used.  Surgery   Radiotherapy   Cemiplimab   Experimental targeted therapy using epidermal growth factor receptor inhibitors    How can cutaneous squamous cell carcinoma be prevented?  There is a great deal of evidence to show that very careful sun protection at any time of life reduces the number of SCCs. This is particularly important in ageing, sun-damaged, fair skin; in patients that are immune suppressed; and in those who already have actinic keratoses or previous SCC.  Stay indoors or under the shade in the middle of the day   Wear covering clothing   Apply high protection factor SPF50+ broad-spectrum sunscreens generously to exposed skin if outdoors   Avoid indoor tanning (sun beds, solaria)    Oral nicotinamide (vitamin B3) in a dose of 500 mg twice daily may reduce the number and severity of SCCs in people at high risk.  Patients with multiple squamous cell carcinomas may be prescribed an oral retinoid (acitretin or isotretinoin). These reduce the number of tumours but have some nuisance side effects.    What is the outlook for cutaneous squamous cell carcinoma?  Most SCCs are cured by treatment. A cure is most likely if treatment is  undertaken when the lesion is small. The risk of recurrence or disease-associated death is greater for tumours that are > 20 mm in diameter and/or > 2 mm in thickness at the time of surgical excision.  About 50% of people at high risk of SCC develop a second one within 5 years of the first. They are also at increased risk of other skin cancers, especially melanoma. Regular self-skin examinations and long-term annual skin checks by an experienced health professional are recommended.

## 2024-12-16 ENCOUNTER — APPOINTMENT (OUTPATIENT)
Dept: PHYSICAL THERAPY | Facility: CLINIC | Age: 64
End: 2024-12-16
Payer: COMMERCIAL

## 2024-12-17 DIAGNOSIS — F90.2 ATTENTION DEFICIT HYPERACTIVITY DISORDER (ADHD), COMBINED TYPE: ICD-10-CM

## 2024-12-18 RX ORDER — DEXTROAMPHETAMINE SACCHARATE, AMPHETAMINE ASPARTATE, DEXTROAMPHETAMINE SULFATE AND AMPHETAMINE SULFATE 2.5; 2.5; 2.5; 2.5 MG/1; MG/1; MG/1; MG/1
10 TABLET ORAL 2 TIMES DAILY
Qty: 60 TABLET | Refills: 0 | Status: SHIPPED | OUTPATIENT
Start: 2024-12-18

## 2024-12-23 ENCOUNTER — APPOINTMENT (OUTPATIENT)
Dept: PHYSICAL THERAPY | Facility: CLINIC | Age: 64
End: 2024-12-23
Payer: COMMERCIAL

## 2024-12-30 ENCOUNTER — APPOINTMENT (OUTPATIENT)
Dept: PHYSICAL THERAPY | Facility: CLINIC | Age: 64
End: 2024-12-30
Payer: COMMERCIAL

## 2025-01-30 NOTE — ASSESSMENT & PLAN NOTE
Osteoporosis is multifactorial. Counseled on basic pathophysiology of osteoporosis, including risk for fracture and morbidty and mortality associated with fracture.  Reviewed need for treatment to reduce risk of fracture.  At this time, recommend once yearly Reclast infusions. Reviewed MOA as well as potential s/e including infusion reaction which includes low grade fever and transient increase in arthralgias/myalgias, ONJ, and aFib. Patient is supplementing with 1,000 iu of Vit D daily and 600 mg of Ca+ twice daily with meals. She is receiving calcium through her diet. She engages in weight bearing activity regularly. Denies any falls or fractures. Provided patient with written educational materials for her review. Check PTH, Vit D, CMP, TSH, and CBC for completeness/to r/o underlying causes of osteoporosis. Once patient has made a decision regarding her therapy, she will contact me. F/U in 6 months.   Orders:    TSH, 3rd generation with Free T4 reflex; Future    Vitamin D 25 hydroxy    PTH, intact    Comprehensive metabolic panel    Ambulatory Referral to Endocrinology    CBC and differential

## 2025-01-30 NOTE — PROGRESS NOTES
Name: Letty Blank      : 1960      MRN: [id number]  Encounter Provider: SUSHILA Zepeda  Encounter Date: 2025   Encounter department: [facility] [place]  :  Assessment & Plan  Localized osteoporosis without current pathological fracture  Osteoporosis is multifactorial. Counseled on basic pathophysiology of osteoporosis, including risk for fracture and morbidty and mortality associated with fracture.  Reviewed need for treatment to reduce risk of fracture.  At this time, recommend once yearly Reclast infusions. Reviewed MOA as well as potential s/e including infusion reaction which includes low grade fever and transient increase in arthralgias/myalgias, ONJ, and aFib. Patient is supplementing with 1,000 iu of Vit D daily and 600 mg of Ca+ twice daily with meals. She is receiving calcium through her diet. She engages in weight bearing activity regularly. Denies any falls or fractures. Provided patient with written educational materials for her review. Check PTH, Vit D, CMP, TSH, and CBC for completeness/to r/o underlying causes of osteoporosis. Once patient has made a decision regarding her therapy, she will contact me. F/U in 6 months.   Orders:    TSH, 3rd generation with Free T4 reflex; Future    Vitamin D 25 hydroxy    PTH, intact    Comprehensive metabolic panel    Ambulatory Referral to Endocrinology    CBC and differential        History of Present Illness   HPI  Letty Blank is a 64 y.o. female with osteoporosis presenting today for consultation.     Referred by OBGYN provider SUSHILA Amador.    PFH significant for osteoporosis in her mother and grandmother.    Patient initially diagnosed with osteoporosis through DEXA scan on 2024 at the age of 64. No history of fragility fractures. No previous hip/spine surgery.     She believes she completed menopause in her early 50s. No history of HRT use. No history of long term glucocorticoid  use. She is a former smoker. She quit nearly 30 years ago. Limited ETOH use.     She engages in regular weight bearing activity with yoga and has started weight training. She sees her dentist regularly. Denies any problems.     Narrative & Impression   DXA SCAN     CLINICAL HISTORY: 64-year-old postmenopausal female.  OTHER RISK FACTORS:  None.     PHARMACOLOGIC THERAPY FOR OSTEOPOROSIS: None.     TECHNIQUE: Bone densitometry was performed using a   Hologic Horizon W bone densitometer.  Regions of interest appear properly placed.     COMPARISON: There are no prior DXA studies performed on this unit for comparison.     RESULTS:     LUMBAR SPINE  Level: L1-L4:  BMD: 0.799 gm/cm2  T-score: -2.3        LEFT  TOTAL HIP:  BMD: 0.662 gm/cm2  T-score: -2.3     LEFT  FEMORAL NECK:  BMD: 0.548 gm/cm2  T score: -2.7        IMPRESSION:     1. Osteoporosis. Based on the left femoral neck     2.  The 10 year risk of hip fracture is 2.9% with the 10 year risk of major osteoporotic fracture being 13% as calculated by the [employer] fracture risk assessment tool (FRAX, which is based on data generated by the WHO Collaborating [place]   for Metabolic Bone Diseases).         Review of Systems   Constitutional:  Negative for activity change, appetite change, fatigue and unexpected weight change.   HENT:  Negative for dental problem, sore throat, trouble swallowing and voice change.    Eyes:  Negative for visual disturbance.   Respiratory:  Negative for cough, chest tightness and shortness of breath.    Cardiovascular:  Negative for chest pain, palpitations and leg swelling.   Gastrointestinal:  Negative for constipation, diarrhea, nausea and vomiting.   Endocrine: Negative for cold intolerance, heat intolerance, polydipsia, polyphagia and polyuria.   Genitourinary:  Negative for frequency.   Musculoskeletal:  Negative for arthralgias, back pain, gait problem and myalgias.   Allergic/Immunologic: Positive for environmental  "allergies. Negative for food allergies.   Neurological:  Negative for dizziness, tremors, weakness, light-headedness, numbness and headaches.   Psychiatric/Behavioral:  Negative for decreased concentration, dysphoric mood and sleep disturbance. The patient is not nervous/anxious.           Objective   /80 (BP Location: Right arm, Patient Position: Sitting, Cuff Size: Standard)   Pulse 84   Temp 97.8 °F (36.6 °C)   Ht 5' 5.4\" (1.661 m)   Wt 77.1 kg (170 lb)   SpO2 98%   BMI 27.94 kg/m²      Physical Exam  Vitals reviewed.   Constitutional:       General: She is not in acute distress.     Appearance: She is well-developed. She is not ill-appearing.   HENT:      Head: Normocephalic and atraumatic.   Eyes:      Conjunctiva/sclera: Conjunctivae normal.      Pupils: Pupils are equal, round, and reactive to light.   Cardiovascular:      Rate and Rhythm: Normal rate.      Pulses: Normal pulses.   Pulmonary:      Effort: Pulmonary effort is normal. No respiratory distress.   Abdominal:      Palpations: Abdomen is soft.      Tenderness: There is no abdominal tenderness.   Musculoskeletal:         General: No swelling, tenderness or deformity.      Cervical back: Normal range of motion and neck supple.      Right lower leg: No edema.      Left lower leg: No edema.   Skin:     General: Skin is warm and dry.      Capillary Refill: Capillary refill takes less than 2 seconds.   Neurological:      Mental Status: She is alert.   Psychiatric:         Mood and Affect: Mood normal.           "

## 2025-01-31 ENCOUNTER — CONSULT (OUTPATIENT)
Age: 65
End: 2025-01-31
Payer: COMMERCIAL

## 2025-01-31 VITALS
TEMPERATURE: 97.8 F | OXYGEN SATURATION: 98 % | HEIGHT: 65 IN | BODY MASS INDEX: 28.32 KG/M2 | SYSTOLIC BLOOD PRESSURE: 122 MMHG | WEIGHT: 170 LBS | HEART RATE: 84 BPM | DIASTOLIC BLOOD PRESSURE: 80 MMHG

## 2025-01-31 DIAGNOSIS — M81.6 LOCALIZED OSTEOPOROSIS WITHOUT CURRENT PATHOLOGICAL FRACTURE: Primary | ICD-10-CM

## 2025-01-31 PROCEDURE — 99203 OFFICE O/P NEW LOW 30 MIN: CPT | Performed by: NURSE PRACTITIONER

## 2025-01-31 PROCEDURE — G2211 COMPLEX E/M VISIT ADD ON: HCPCS | Performed by: NURSE PRACTITIONER

## 2025-01-31 RX ORDER — LANOLIN ALCOHOL/MO/W.PET/CERES
1 CREAM (GRAM) TOPICAL 2 TIMES DAILY
COMMUNITY

## 2025-01-31 NOTE — PATIENT INSTRUCTIONS
Osteoporosis Education   Osteoporosis  is a long-term medical condition that causes your bones to become weak, brittle, and more likely to fracture. Osteoporosis occurs when your body absorbs more bone than it makes. It is also caused by a lack of calcium and estrogen (female hormone).  Common symptoms include the following:  You may not have any signs or symptoms. You may break a bone after a muscle strain, bump, or fall. A break usually occurs in the hip, spine, or wrist. A collapsed vertebra (bone in your spine) may cause severe back pain or loss of height from bent posture.  Call your doctor if:    You have severe pain.   You have increasing pain after a fall.   You have pain when you do your daily activities.   You have questions or concerns about your condition or care.  Diagnosis of osteoporosis:   Blood and urine tests  measure your calcium, vitamin D, and estrogen levels.    An x-ray or CT may show thinned bones or a fracture. You may be given contrast liquid to help the bones show up better in the pictures. Tell the healthcare provider if you have ever had an allergic reaction to contrast liquid. Do not enter the MRI room with anything metal. Metal can cause serious injury. Tell the healthcare provider if you have any metal in or on your body.    A bone density test  compares your bone thickness with what is expected for someone of your age, gender, and ethnicity.  Treatment for osteoporosis may include medicines to prevent bone loss, build new bone, and increase estrogen. These medicines help prevent fractures and may be given as a pill or injection. Ask your healthcare provider for more information on these medicines.  Prevent bone loss:  Eat healthy foods that are high in calcium.  This helps keep your bones strong. Good sources of calcium are milk, cheese, broccoli, tofu, almonds, and canned salmon and sardines. Recommended to get at least 1200mg daily of calcium.  Increase your vitamin D intake.   "Vitamin D is in fish oils, some vegetables, and fortified milk, cereal, and bread. Vitamin D is also formed in the skin when it is exposed to the sun. Ask your healthcare provider how much sunlight is safe for you. You will require at least 800 units of vitamin D daily taken as a supplement.  Drink liquids as directed.  Ask your healthcare provider how much liquid to drink each day and which liquids are best for you. Do not have alcohol or caffeine. They decrease bone mineral density, which can weaken your bones.  Exercise regularly.  Ask your healthcare provider about the best exercise plan for you. Weight bearing exercise for 30 minutes, 3 times a week can help build and strengthen bone.  Do not smoke.  Nicotine and other chemicals in cigarettes and cigars can cause lung damage. Ask your healthcare provider for information if you currently smoke and need help to quit. E-cigarettes or smokeless tobacco still contain nicotine. Talk to your healthcare provider before you use these products.  Go to physical therapy as directed.  A physical therapist teaches you exercises to help improve movement and muscle strength.  Alcohol. It is recommended to avoid heavy alcohol use as increased consumption of alcohol is known to cause bone loss  © Copyright [employer] 2021 Information is for End User's use only and may not be sold, redistributed or otherwise used for commercial purposes. All illustrations and images included in CareNotes® are the copyrighted property of A.D.A.M., Inc. or [employer]    Calcium and vitamin D for bone health (Beyond the Basics)    CALCIUM AND VITAMIN D OVERVIEW  Osteoporosis is a common bone disorder that causes a progressive loss in bone density and mass. As a result, bones become thin, weakened, and easily fractured. It is estimated that more than 1.3 million osteoporosis-associated (or \"osteoporotic\") fractures occur every year in the [place], primarily of bone within the spine " (the vertebrae), the hip, and the forearm near the wrist. =    A number of treatments can help to prevent loss of bone and treat low bone mass. However, the first step in preventing or treating osteoporosis is to consume foods and drinks that provide calcium, a mineral essential for bone strength, and vitamin D, which aids in calcium breakdown and absorption. =    CALCIUM AND VITAMIN D BENEFITS  Good nutrition is important at all ages to keep the bones healthy.    Taking calcium reduces bone loss and decreases the risk of fracturing the vertebrae (the bones that surround the spinal cord).  Consuming calcium during childhood (eg, in milk) can lead to higher bone mass in adulthood. This increase in bone density can reduce the risk of fractures later in life.  Calcium may also have benefits in other body systems by reducing blood pressure and cholesterol levels.  Calcium and vitamin D supplements may help prevent tooth loss in older adults.    RECOMMENDATIONS FOR CALCIUM    General recommendations -- Premenopausal women and men should consume at least 1000 mg of calcium, while postmenopausal women should consume 1200 mg (total diet plus supplement). You should not consume more than 2000 mg of calcium per day (total diet plus supplement) due to the risk of side effects.    Calcium in the diet -- The primary sources of calcium in the diet include milk and other dairy products, such as hard cheese, cottage cheese, or yogurt, as well as green vegetables, such as kale and broccoli (table 1). Some cereals, soy products, and fruit juices are fortified with calcium.    Calcium supplements -- The body is able to absorb calcium contained in supplements as well as from dietary sources. If it is not possible to get enough calcium from dietary sources, consult a health care provider to determine the best type, dose, and timing of calcium supplements.     Calcium carbonate is effective and is the least expensive form of calcium. It  is best absorbed with a low-iron meal (such as breakfast). Calcium carbonate may not be absorbed well in people who also take a specific medication for gastroesophageal reflux (called a proton pump inhibitor or H2 blocker), which blocks stomach acid.  Many natural calcium carbonate preparations such as oyster shells or bone meal contain some lead.  Calcium citrate is well absorbed in the fasting state as well as with a meal.  Calcium doses above 500 mg are not absorbed as well as smaller doses, so large doses of supplements should be taken in divided doses (eg, in the morning and evening).  Calcium supplements do not replace other osteoporosis treatments such as hormone replacement, bisphosphonates (eg, risedronate [sample brand name: Actonel] and alendronate [brand name: Fosamax]), and raloxifene (brand name: Evista).    Calcium and vitamin D supplements alone are usually insufficient to prevent age-related bone loss, although they may be beneficial in some subgroups (older adults, those with very low intake). In most patients with or at risk for osteoporosis, the addition of medication or hormonal therapy is necessary in order to slow the breakdown and removal of bone (ie, resorption).     Underlying gastrointestinal diseases -- Patients who do not adequately absorb nutrients from the gastrointestinal tract (due to malabsorption) may require more than 1000 to 1200 mg of calcium per day. In such cases, a health care provider can help to determine the optimal dose of calcium.    Medications -- All medications should be discussed with a health care provider to ensure that possible interactions with calcium are identified. Certain medications change the amount of calcium that is absorbed and/or excreted. As an example, loop diuretics (eg, furosemide [sample brand name: Lasix]) increase the amount of calcium excreted in the urine.    On the other hand, thiazide diuretics (eg, hydrochlorothiazide) can reduce levels of  calcium in the urine, potentially reducing the risk of bone loss and kidney stones.    Side effects of calcium -- Calcium is usually easily tolerated when it is taken in divided doses several times per day. Some people experience side effects related to calcium, including constipation and indigestion. Calcium supplements interfere with the absorption of iron and thyroid hormone, and therefore, these medications should be taken at different times.    Kidney stones -- There is no evidence that consuming large amounts of calcium (from foods and drinks) increases the risk of kidney stones, or that avoiding dietary calcium decreases the risk. In fact, avoiding dairy products is likely to increase the risk of kidney stones.    However, use of calcium supplements may increase the risk of kidney stones in susceptible individuals by raising the level of calcium in the urine. This is particularly true if the supplement is taken between meals or at bedtime, and this is one of the reasons we prefer for patients to get as much of their calcium requirement through dietary means.     IMPORTANCE OF VITAMIN D  Vitamin D decreases bone loss and lowers the risk of fracture, especially in older men and women. Along with calcium, vitamin D also helps to prevent and treat osteoporosis. To absorb calcium efficiently, an adequate amount of vitamin D must be present.    Vitamin D is normally made in the skin after exposure to sunlight.    Recommendations for vitamin D -- The current recommendation is that men over 70 years and postmenopausal women consume at least 800 international units (20 micrograms) of vitamin D per day. Lower levels of vitamin D are not as effective, while high doses can be toxic, especially if taken for long periods of time. Although the optimal intake has not been clearly established in premenopausal women or in younger men with osteoporosis, 600 international units (15 micrograms) of vitamin D daily is generally  suggested.    Vitamin D is available as an individual supplement and is included in most multivitamins and some calcium supplements (table 2). Milk is a relatively good dietary source of vitamin D, with approximately 100 international units (2.5 micrograms) per cup (240 mL), and salmon has 800 to 1000 international units (20 to 25 micrograms) of vitamin D per serving.    Most healthy people don't need to check with their health care provider before taking standard doses of vitamin D and don't need monitoring of vitamin D levels if they are not being treated for vitamin D deficiency. However, recommendations for vitamin D supplementation may be different in people with certain underlying medical conditions, such as sarcoidosis or lymphoma. In these situations, a provider will determine if supplements are needed; if so, the person's vitamin D and calcium levels should be monitored with regular tests.    ©2021 [employer] All rights reserved.  Medicines for Osteoporosis (The Basics)    What do osteoporosis medicines do?  If you have osteoporosis or a high risk of breaking a bone, the medicines your doctor prescribes can:  Reduce bone loss  Increase bone density or keep it about the same  Reduce the chances that you will break a bone    For the medicines to work, you must also take calcium and vitamin D supplements.     Which medicines might I need?  There are many different osteoporosis medicines. Your doctor will work with you to choose the best one for you.    Bisphosphonates  Most people being treated for osteoporosis take these medicines first. If they do not work well enough or cause side effects that are too hard to handle, there are other options.    Bisphosphonates come in a pill or a shot. Most people take one pill every week. If your doctor prescribes a bisphosphonate pill, you must take the medicine exactly as directed. If you don't, the medicine can irritate your throat or stomach. For most  "bisphosphonate pills, you must:    Take the pill first thing in the morning, before you have anything to eat or drink.  Drink an 8-ounce glass of water with the pill, but not eat or drink anything else for 30 minutes or 1 hour (depending on which pill you take).  Avoid lying down for 30 minutes after taking the pill. You must sit or stand during that time.    There is one bisphosphonate pill, delayed release risedronate (brand name: Atelvia), that is taken in a different way from the others. It is taken after breakfast with 4 ounces of water.    \"Estrogen-like\" medicines  Medicines called selective estrogen receptor modifiers (or \"SERMs\") act like the hormone \"estrogen.\" Estrogen helps prevent bone loss. After menopause, a woman's body has less estrogen. (Menopause is the time when a woman stops having periods.) SERMs can act like estrogen to stop bone loss. Some of them also reduce the risk of breast cancer in women at high risk. SERMs are only for women who have gone through menopause.    Hormone medicines  These medicines are sometimes called \"hormone replacement therapy\" or \"menopausal hormone therapy\" (MHT). After menopause, a woman's body has lower levels of certain hormones. Some women take MHT to replace these hormones. MHT can also protect against osteoporosis.    Hormones are not used often to treat osteoporosis in women who have gone through menopause. This is because other medicines usually work much better. But MHT can help women who have bothersome symptoms related to menopause (such as hot flashes) and who have osteoporosis but cannot take other osteoporosis medicines.    Women who have not gone through menopause might take hormones in birth control pills or a patch to prevent osteoporosis.    PTH or PTHrP analog  Both of these are artificial forms of hormones the body makes naturally. PTH stands for \"parathyroid hormone,\" and PTHrP stands for \"parathyroid hormone-related protein.\" Both tell the body " to make new bone. They are usually only for people with severe osteoporosis.    Romosozumab (Evenity)  Romosozumab is a medicine that blocks a protein in the body. This protein usually stops new bone from being formed. Blocking the protein allows the body to make new bone. Romosozumab is usually only for people with severe osteoporosis.    Denosumab (Prolia)  Denosumab blocks a different protein in the body. This protein usually causes bone to break down. By blocking the protein, denosumab reduces bone loss and the chance of breaking a bone. If other osteoporosis medicines cause bad side effects or do not help, your doctor might give you denosumab. It might also be a good choice for people with kidney problems. When you stop taking denosumab, your bone density goes down again very quickly. Some people might be at higher risk for breaking a bone when this happens. If you stop denosumab, your doctor will prescribe a different osteoporosis medicine to prevent rapid bone loss.    Calcitonin  Calcitonin is a hormone the body makes naturally. Doctors can give a man-made form to treat osteoporosis. It is not used as often as other medicines because it does not work as well. But it can help relieve pain from broken bones in the spine. When used for broken bones in the spine, calcitonin should only be used until the pain is better (and not longer than 6 months). If you are put on calcitonin, after 6 months you should switch to a medicine that is better at preventing fractures.    How long do I need to take osteoporosis medicines?  If you are at high risk for breaking a bone, you can safely take osteoporosis medicines for many years. If you are not at high risk for breaking a bone, you might be able to stop your medicine for a year or more. Your doctor will check your bone density to make sure you are not losing too much bone. If you do stop the medicine, you will probably need to start it again later.    What else should I  know about medicines for osteoporosis?  Some people have heard that taking bisphosphonates for a long time can increase the risk of breaking certain bones. This is true, but it happens very rarely. Your chances of breaking a bone from osteoporosis are much higher than your chances of breaking one because you take bisphosphonates.    If you take osteoporosis medicines, your doctor will do regular exams and tests to see how well the medicines are working. If they are not working well, you might need a different medicine.    ©2021 [employer] All rights reserved.

## 2025-03-05 DIAGNOSIS — F90.2 ATTENTION DEFICIT HYPERACTIVITY DISORDER (ADHD), COMBINED TYPE: ICD-10-CM

## 2025-03-06 RX ORDER — DEXTROAMPHETAMINE SACCHARATE, AMPHETAMINE ASPARTATE, DEXTROAMPHETAMINE SULFATE AND AMPHETAMINE SULFATE 2.5; 2.5; 2.5; 2.5 MG/1; MG/1; MG/1; MG/1
10 TABLET ORAL 2 TIMES DAILY
Qty: 60 TABLET | Refills: 0 | Status: SHIPPED | OUTPATIENT
Start: 2025-03-06

## 2025-04-03 ENCOUNTER — TELEPHONE (OUTPATIENT)
Age: 65
End: 2025-04-03

## 2025-04-03 NOTE — TELEPHONE ENCOUNTER
Called Letty. Advised her that I mailed her lab orders to her and when Silvana reviews the results, she can order the Reclast pending results

## 2025-04-09 DIAGNOSIS — F90.2 ATTENTION DEFICIT HYPERACTIVITY DISORDER (ADHD), COMBINED TYPE: ICD-10-CM

## 2025-04-09 RX ORDER — DEXTROAMPHETAMINE SACCHARATE, AMPHETAMINE ASPARTATE, DEXTROAMPHETAMINE SULFATE AND AMPHETAMINE SULFATE 2.5; 2.5; 2.5; 2.5 MG/1; MG/1; MG/1; MG/1
10 TABLET ORAL 2 TIMES DAILY
Qty: 60 TABLET | Refills: 0 | Status: SHIPPED | OUTPATIENT
Start: 2025-04-09

## 2025-04-09 RX ORDER — MULTIVITAMIN
TABLET ORAL
Refills: 0 | OUTPATIENT
Start: 2025-04-09

## 2025-04-18 ENCOUNTER — RESULTS FOLLOW-UP (OUTPATIENT)
Dept: ENDOCRINOLOGY | Facility: CLINIC | Age: 65
End: 2025-04-18

## 2025-04-18 ENCOUNTER — TELEPHONE (OUTPATIENT)
Age: 65
End: 2025-04-18

## 2025-04-18 LAB
25(OH)D3+25(OH)D2 SERPL-MCNC: 61.8 NG/ML (ref 30–100)
ALBUMIN SERPL-MCNC: 4.7 G/DL (ref 3.9–4.9)
ALP SERPL-CCNC: 88 IU/L (ref 44–121)
ALT SERPL-CCNC: 34 IU/L (ref 0–32)
AST SERPL-CCNC: 30 IU/L (ref 0–40)
BASOPHILS # BLD AUTO: 0.1 X10E3/UL (ref 0–0.2)
BASOPHILS NFR BLD AUTO: 1 %
BILIRUB SERPL-MCNC: 0.3 MG/DL (ref 0–1.2)
BUN SERPL-MCNC: 19 MG/DL (ref 8–27)
BUN/CREAT SERPL: 20 (ref 12–28)
CALCIUM SERPL-MCNC: 10.5 MG/DL (ref 8.7–10.3)
CHLORIDE SERPL-SCNC: 102 MMOL/L (ref 96–106)
CO2 SERPL-SCNC: 25 MMOL/L (ref 20–29)
CREAT SERPL-MCNC: 0.94 MG/DL (ref 0.57–1)
EGFR: 68 ML/MIN/1.73
EOSINOPHIL # BLD AUTO: 0.2 X10E3/UL (ref 0–0.4)
EOSINOPHIL NFR BLD AUTO: 2 %
ERYTHROCYTE [DISTWIDTH] IN BLOOD BY AUTOMATED COUNT: 13 % (ref 11.7–15.4)
GLOBULIN SER-MCNC: 2.5 G/DL (ref 1.5–4.5)
GLUCOSE SERPL-MCNC: 107 MG/DL (ref 70–99)
HCT VFR BLD AUTO: 46.6 % (ref 34–46.6)
HGB BLD-MCNC: 15.4 G/DL (ref 11.1–15.9)
IMM GRANULOCYTES # BLD: 0 X10E3/UL (ref 0–0.1)
IMM GRANULOCYTES NFR BLD: 0 %
LYMPHOCYTES # BLD AUTO: 2.9 X10E3/UL (ref 0.7–3.1)
LYMPHOCYTES NFR BLD AUTO: 37 %
MCH RBC QN AUTO: 28.2 PG (ref 26.6–33)
MCHC RBC AUTO-ENTMCNC: 33 G/DL (ref 31.5–35.7)
MCV RBC AUTO: 85 FL (ref 79–97)
MONOCYTES # BLD AUTO: 0.4 X10E3/UL (ref 0.1–0.9)
MONOCYTES NFR BLD AUTO: 6 %
NEUTROPHILS # BLD AUTO: 4.2 X10E3/UL (ref 1.4–7)
NEUTROPHILS NFR BLD AUTO: 54 %
PLATELET # BLD AUTO: 322 X10E3/UL (ref 150–450)
POTASSIUM SERPL-SCNC: 5 MMOL/L (ref 3.5–5.2)
PROT SERPL-MCNC: 7.2 G/DL (ref 6–8.5)
PTH-INTACT SERPL-MCNC: 38 PG/ML (ref 15–65)
RBC # BLD AUTO: 5.46 X10E6/UL (ref 3.77–5.28)
SODIUM SERPL-SCNC: 141 MMOL/L (ref 134–144)
TSH SERPL DL<=0.005 MIU/L-ACNC: 2.08 UIU/ML (ref 0.45–4.5)
WBC # BLD AUTO: 7.7 X10E3/UL (ref 3.4–10.8)

## 2025-04-18 NOTE — TELEPHONE ENCOUNTER
Patient called looking for sooner appointment since her lab results came in. No sooner appointment at this time, made her aware and placed her on wait list.     Patient asking if after her appointment if we can assist in getting her scheduled for reclast.  She is asking for a call back from clinical staff to go over the protocol when it comes to reclast and the next steps.     Please advise.

## 2025-04-29 ENCOUNTER — TELEPHONE (OUTPATIENT)
Dept: ENDOCRINOLOGY | Facility: CLINIC | Age: 65
End: 2025-04-29

## 2025-04-29 NOTE — TELEPHONE ENCOUNTER
Called patient to reschedule her appointment and she was very concerned because she was scheduled pola to go over her lab work  for possible Reclast Infusion. Patient was wondering if there is any way she could be seen sooner then November, told patient I would speak to provider and give her a call back. Thank You

## 2025-04-30 ENCOUNTER — TELEPHONE (OUTPATIENT)
Age: 65
End: 2025-04-30

## 2025-04-30 ENCOUNTER — TREATMENT (OUTPATIENT)
Dept: ENDOCRINOLOGY | Facility: CLINIC | Age: 65
End: 2025-04-30

## 2025-04-30 DIAGNOSIS — M81.6 LOCALIZED OSTEOPOROSIS WITHOUT CURRENT PATHOLOGICAL FRACTURE: Primary | ICD-10-CM

## 2025-04-30 RX ORDER — SODIUM CHLORIDE 9 MG/ML
20 INJECTION, SOLUTION INTRAVENOUS ONCE
OUTPATIENT
Start: 2025-05-07

## 2025-04-30 RX ORDER — ZOLEDRONIC ACID 0.05 MG/ML
5 INJECTION, SOLUTION INTRAVENOUS ONCE
OUTPATIENT
Start: 2025-05-07

## 2025-04-30 NOTE — TELEPHONE ENCOUNTER
Called Letty. Let her know that her infusion is scheduled for May 19th at 10:30.    I let her know that her insurance company said no authorization is required. I am not certain if there is a co pay. She may want to call her insurance company to inquire.

## 2025-04-30 NOTE — TELEPHONE ENCOUNTER
Called patients insurance company to get authorization for RECLAST INFUSION.  [phone]  CPT     Not authorization is required     Ref# [id number]

## 2025-04-30 NOTE — Clinical Note
Pt ok to keep July appointment. Please help her schedule infusion at [facility] for Reclast on a Monday or Wednesday in May. Thank you.

## 2025-04-30 NOTE — PROGRESS NOTES
Called and spoke with patient on the phone. We reviewed all of her labs. She is cleared to start once yearly Reclast infusions, which she would like to do before June. Will place therapy plan now. Reviewed MOA as well as potential s/e including transient arthralgias/myalgias. Instructed to remain well hydrated and ok to take NSAIDs or tylenol after infusion prn. Ok to keep scheduled July appointment.

## 2025-05-15 ENCOUNTER — TELEPHONE (OUTPATIENT)
Dept: INFUSION CENTER | Facility: CLINIC | Age: 65
End: 2025-05-15

## 2025-05-15 NOTE — TELEPHONE ENCOUNTER
LVM regarding upcoming first appointment, verified appointment 5/19 1030am, infusion centers policies and procedures including visitor/no show policy.  Asked pt to get CMP drawn by Saturday. Left call back number and encouraged to call back with any questions.

## 2025-05-16 DIAGNOSIS — M81.6 LOCALIZED OSTEOPOROSIS WITHOUT CURRENT PATHOLOGICAL FRACTURE: Primary | ICD-10-CM

## 2025-05-16 RX ORDER — SODIUM CHLORIDE 9 MG/ML
20 INJECTION, SOLUTION INTRAVENOUS ONCE
Status: CANCELLED | OUTPATIENT
Start: 2025-05-31

## 2025-05-16 RX ORDER — ZOLEDRONIC ACID 0.05 MG/ML
5 INJECTION, SOLUTION INTRAVENOUS ONCE
Status: CANCELLED | OUTPATIENT
Start: 2025-05-31

## 2025-05-17 LAB
ALBUMIN SERPL-MCNC: 4.5 G/DL (ref 3.9–4.9)
ALP SERPL-CCNC: 72 IU/L (ref 44–121)
ALT SERPL-CCNC: 23 IU/L (ref 0–32)
AST SERPL-CCNC: 20 IU/L (ref 0–40)
BILIRUB SERPL-MCNC: 0.2 MG/DL (ref 0–1.2)
BUN SERPL-MCNC: 21 MG/DL (ref 8–27)
BUN/CREAT SERPL: 26 (ref 12–28)
CALCIUM SERPL-MCNC: 9.9 MG/DL (ref 8.7–10.3)
CHLORIDE SERPL-SCNC: 103 MMOL/L (ref 96–106)
CO2 SERPL-SCNC: 23 MMOL/L (ref 20–29)
CREAT SERPL-MCNC: 0.81 MG/DL (ref 0.57–1)
EGFR: 81 ML/MIN/1.73
GLOBULIN SER-MCNC: 2.4 G/DL (ref 1.5–4.5)
GLUCOSE SERPL-MCNC: 92 MG/DL (ref 70–99)
POTASSIUM SERPL-SCNC: 4.5 MMOL/L (ref 3.5–5.2)
PROT SERPL-MCNC: 6.9 G/DL (ref 6–8.5)
SODIUM SERPL-SCNC: 142 MMOL/L (ref 134–144)

## 2025-05-19 ENCOUNTER — HOSPITAL ENCOUNTER (OUTPATIENT)
Dept: INFUSION CENTER | Facility: CLINIC | Age: 65
Discharge: HOME | End: 2025-05-19
Attending: STUDENT IN AN ORGANIZED HEALTH CARE EDUCATION/TRAINING PROGRAM
Payer: COMMERCIAL

## 2025-05-19 VITALS
WEIGHT: 171 LBS | HEIGHT: 65 IN | RESPIRATION RATE: 18 BRPM | HEART RATE: 65 BPM | DIASTOLIC BLOOD PRESSURE: 70 MMHG | SYSTOLIC BLOOD PRESSURE: 145 MMHG | BODY MASS INDEX: 28.49 KG/M2 | TEMPERATURE: 97.7 F

## 2025-05-19 DIAGNOSIS — M81.6 LOCALIZED OSTEOPOROSIS WITHOUT CURRENT PATHOLOGICAL FRACTURE: Primary | ICD-10-CM

## 2025-05-19 RX ORDER — ZOLEDRONIC ACID 0.05 MG/ML
5 INJECTION, SOLUTION INTRAVENOUS ONCE
Status: CANCELLED | OUTPATIENT
Start: 2025-05-31

## 2025-05-19 RX ORDER — SODIUM CHLORIDE 9 MG/ML
20 INJECTION, SOLUTION INTRAVENOUS ONCE
OUTPATIENT
Start: 2026-05-19

## 2025-05-19 RX ORDER — SODIUM CHLORIDE 9 MG/ML
20 INJECTION, SOLUTION INTRAVENOUS ONCE
Status: COMPLETED | OUTPATIENT
Start: 2025-05-19 | End: 2025-05-19

## 2025-05-19 RX ORDER — ZOLEDRONIC ACID 0.05 MG/ML
5 INJECTION, SOLUTION INTRAVENOUS ONCE
Status: COMPLETED | OUTPATIENT
Start: 2025-05-19 | End: 2025-05-19

## 2025-05-19 RX ORDER — ZOLEDRONIC ACID 0.05 MG/ML
5 INJECTION, SOLUTION INTRAVENOUS ONCE
OUTPATIENT
Start: 2026-05-19

## 2025-05-19 RX ORDER — SODIUM CHLORIDE 9 MG/ML
20 INJECTION, SOLUTION INTRAVENOUS ONCE
Status: CANCELLED | OUTPATIENT
Start: 2025-05-31

## 2025-05-19 RX ADMIN — SODIUM CHLORIDE 20 ML/HR: 9 INJECTION, SOLUTION INTRAVENOUS at 11:25

## 2025-05-19 RX ADMIN — ZOLEDRONIC ACID 5 MG: 5 INJECTION INTRAVENOUS at 11:24

## 2025-05-19 NOTE — PROGRESS NOTES
Pt to clinic for Reclast. Pt offers no complaints and denies any upcoming dental procedures. Crcl = 63.5 from labs drawn on 5/16/25. Pt tolerated infusion without complications. PIV removed. Pt aware of next appointment on 5/20/26 at 900. AVS declined.

## 2025-05-20 ENCOUNTER — RESULTS FOLLOW-UP (OUTPATIENT)
Dept: ENDOCRINOLOGY | Facility: CLINIC | Age: 65
End: 2025-05-20

## 2025-06-28 DIAGNOSIS — F90.2 ATTENTION DEFICIT HYPERACTIVITY DISORDER (ADHD), COMBINED TYPE: ICD-10-CM

## 2025-07-01 RX ORDER — DEXTROAMPHETAMINE SACCHARATE, AMPHETAMINE ASPARTATE, DEXTROAMPHETAMINE SULFATE AND AMPHETAMINE SULFATE 2.5; 2.5; 2.5; 2.5 MG/1; MG/1; MG/1; MG/1
10 TABLET ORAL 2 TIMES DAILY
Qty: 60 TABLET | Refills: 0 | Status: SHIPPED | OUTPATIENT
Start: 2025-07-01 | End: 2025-07-10 | Stop reason: SDUPTHER

## 2025-07-10 ENCOUNTER — OFFICE VISIT (OUTPATIENT)
Dept: FAMILY MEDICINE CLINIC | Facility: CLINIC | Age: 65
End: 2025-07-10
Payer: COMMERCIAL

## 2025-07-10 VITALS
BODY MASS INDEX: 27.99 KG/M2 | HEIGHT: 65 IN | HEART RATE: 72 BPM | SYSTOLIC BLOOD PRESSURE: 120 MMHG | OXYGEN SATURATION: 97 % | WEIGHT: 168 LBS | RESPIRATION RATE: 14 BRPM | DIASTOLIC BLOOD PRESSURE: 80 MMHG | TEMPERATURE: 97.5 F

## 2025-07-10 DIAGNOSIS — J06.9 UPPER RESPIRATORY TRACT INFECTION, UNSPECIFIED TYPE: ICD-10-CM

## 2025-07-10 DIAGNOSIS — R09.81 SINUS CONGESTION: Primary | ICD-10-CM

## 2025-07-10 DIAGNOSIS — F90.2 ATTENTION DEFICIT HYPERACTIVITY DISORDER (ADHD), COMBINED TYPE: ICD-10-CM

## 2025-07-10 PROCEDURE — 99213 OFFICE O/P EST LOW 20 MIN: CPT | Performed by: NURSE PRACTITIONER

## 2025-07-10 RX ORDER — AZITHROMYCIN 250 MG/1
TABLET, FILM COATED ORAL
Qty: 6 TABLET | Refills: 0 | Status: SHIPPED | OUTPATIENT
Start: 2025-07-10 | End: 2025-07-15

## 2025-07-10 RX ORDER — PREDNISONE 20 MG/1
20 TABLET ORAL DAILY
Qty: 5 TABLET | Refills: 0 | Status: SHIPPED | OUTPATIENT
Start: 2025-07-10

## 2025-07-10 RX ORDER — DEXTROAMPHETAMINE SACCHARATE, AMPHETAMINE ASPARTATE, DEXTROAMPHETAMINE SULFATE AND AMPHETAMINE SULFATE 2.5; 2.5; 2.5; 2.5 MG/1; MG/1; MG/1; MG/1
10 TABLET ORAL 2 TIMES DAILY
Qty: 60 TABLET | Refills: 0 | Status: SHIPPED | OUTPATIENT
Start: 2025-07-10

## 2025-07-10 NOTE — PROGRESS NOTES
Name: Letty Blank      : 1960      MRN: [id number]  Encounter Provider: SUSHILA Weathers  Encounter Date: 7/10/2025   Encounter department: [facility]  :  Assessment & Plan  Sinus congestion  Patient has sinus pain, pressure, congestion.  Prednisone therapy, may use steam to help decongest Orders:    predniSONE 20 mg tablet; Take 1 tablet (20 mg total) by mouth daily    Upper respiratory tract infection, unspecified type  Patient has upper respiratory symptoms ongoing for more than a week.  Has productive cough.  May start Zithromax.  Use steam to help decongest increase fluid hydration, tea with honey.  Orders:    azithromycin (Zithromax) 250 mg tablet; Take 2 tablets (500 mg total) by mouth daily for 1 day, THEN 1 tablet (250 mg total) daily for 4 days.          Depression Screening and Follow-up Plan: Patient was screened for depression during today's encounter. They screened negative with a PHQ-2 score of 0.        History of Present Illness   Patient is being seen for complaints of productive cough sinus congestion eye discharge ongoing for more than a week has been using Flonase over-the-counter medication with little relief      Review of Systems   Constitutional:  Negative for chills and fever.   HENT:  Positive for congestion, postnasal drip, rhinorrhea, sinus pressure and sinus pain. Negative for ear pain and sore throat.    Eyes:  Negative for pain and visual disturbance.   Respiratory:  Positive for cough. Negative for shortness of breath.    Cardiovascular:  Negative for chest pain and palpitations.   Gastrointestinal:  Negative for abdominal pain and vomiting.   Genitourinary:  Negative for dysuria and hematuria.   Musculoskeletal:  Negative for arthralgias and back pain.   Skin:  Negative for color change and rash.   Neurological:  Negative for seizures and syncope.   All other systems reviewed and are negative.      Objective   /80 (BP Location: Left arm,  "Patient Position: Sitting, Cuff Size: Large)   Pulse 72   Temp 97.5 °F (36.4 °C) (Temporal)   Resp 14   Ht 5' 5.43\" (1.662 m)   Wt 76.2 kg (168 lb)   SpO2 97%   BMI 27.59 kg/m²      Physical Exam  Constitutional:       General: She is not in acute distress.     Appearance: Normal appearance. She is obese. She is not ill-appearing.   HENT:      Head: Normocephalic and atraumatic.      Nose:      Right Sinus: Frontal sinus tenderness present.      Left Sinus: Frontal sinus tenderness present.      Mouth/Throat:      Mouth: Mucous membranes are moist.     Eyes:      General: Allergic shiner present.      Pupils: Pupils are equal, round, and reactive to light.       Cardiovascular:      Rate and Rhythm: Normal rate and regular rhythm.      Pulses: Normal pulses.   Pulmonary:      Effort: Pulmonary effort is normal. No respiratory distress.      Breath sounds: Normal breath sounds.   Chest:      Chest wall: Tenderness present.   Abdominal:      General: Abdomen is flat. Bowel sounds are normal. There is no distension.      Palpations: There is no mass.      Tenderness: There is no abdominal tenderness.     Musculoskeletal:         General: Normal range of motion.      Cervical back: Normal range of motion and neck supple.     Skin:     General: Skin is warm and dry.     Neurological:      General: No focal deficit present.      Mental Status: She is alert and oriented to person, place, and time.     Psychiatric:         Mood and Affect: Mood normal.         Behavior: Behavior normal.         Thought Content: Thought content normal.         Judgment: Judgment normal.         "

## 2025-07-10 NOTE — PATIENT INSTRUCTIONS
Prednisone daily for 5 days  Zithromax 2 pills today then 1 for the next 4 days  Steam to help decongest  Patient Education     Upper Respiratory Infection ED   General Information   You came to the Emergency Department (ED) for an upper respiratory infection or URI. A URI can affect your nose, throat, ears, and sinuses. A virus is the cause of almost all URIs and antibiotics will not help you feel better more quickly. The common cold is an example of a viral URI.  URIs are easy to spread from person to person, most often through coughing or sneezing. A URI will almost always get better in a week or two without any treatment.  What care is needed at home?   Call your regular doctor to let them know you were in the ED. Make a follow-up appointment if you were told to.  If you smoke, try to quit. Your doctor or nurse can help.  Drink lots of fluids like water, juice, or broth. This will help replace any fluids lost if you have a runny nose or fever. Warm tea or soup can help soothe a sore throat.  If the air in your home feels dry, use a cool mist humidifier. This can help a stuffy nose and make it easier to breathe.  You can also use saline nose drops or spray to relieve stuffiness.  If you decide to take over-the-counter cough or cold medicines, follow the directions on the label carefully. Be sure you do not take more than 1 medicine that contains acetaminophen. Also, if you have a heart problem or high blood pressure, check with your doctor before you take any of these medicines.  Wash your hands often. Cough or sneeze into a tissue or your elbow instead of your hands. When around others, you might also want to wear a face mask. These steps can help keep others around you healthy.  When do I need to get emergency help?   Return to the ED if:   You have trouble breathing when talking or sitting still.  When do I need to call the doctor?   You have a fever of 100.4°F (38°C) or higher for several days, chills, a very  bad sore throat, or ear or sinus pain.  You develop a new fever after several days of feeling the same or improving.  You develop chest pain when you cough.  You have a cough that lasts more than 10 days.  You cough up blood.  You have new or worsening symptoms.  Last Reviewed Date   2022-02-01  Consumer Information Use and Disclaimer   This generalized information is a limited summary of diagnosis, treatment, and/or medication information. It is not meant to be comprehensive and should be used as a tool to help the user understand and/or assess potential diagnostic and treatment options. It does NOT include all information about conditions, treatments, medications, side effects, or risks that may apply to a specific patient. It is not intended to be medical advice or a substitute for the medical advice, diagnosis, or treatment of a health care provider based on the health care provider's examination and assessment of a patient’s specific and unique circumstances. Patients must speak with a health care provider for complete information about their health, medical questions, and treatment options, including any risks or benefits regarding use of medications. This information does not endorse any treatments or medications as safe, effective, or approved for treating a specific patient. [employer] and its affiliates disclaim any warranty or liability relating to this information or the use thereof. The use of this information is governed by the Terms of Use, available at https://www.woltersGradFlyuwer.com/en/know/clinical-effectiveness-terms   Copyright   Copyright © 2024 [employer] and its affiliates and/or licensors. All rights reserved.

## 2025-07-15 ENCOUNTER — TELEPHONE (OUTPATIENT)
Age: 65
End: 2025-07-15